# Patient Record
Sex: FEMALE | Race: WHITE | NOT HISPANIC OR LATINO | Employment: OTHER
[De-identification: names, ages, dates, MRNs, and addresses within clinical notes are randomized per-mention and may not be internally consistent; named-entity substitution may affect disease eponyms.]

---

## 2017-01-03 ENCOUNTER — CHARTING TRANS (OUTPATIENT)
Dept: OTHER | Age: 77
End: 2017-01-03

## 2017-01-05 ENCOUNTER — CHARTING TRANS (OUTPATIENT)
Dept: OTHER | Age: 77
End: 2017-01-05

## 2017-01-06 ENCOUNTER — BH HISTORICAL (OUTPATIENT)
Dept: OTHER | Age: 77
End: 2017-01-06

## 2017-02-03 ENCOUNTER — CHARTING TRANS (OUTPATIENT)
Dept: OTHER | Age: 77
End: 2017-02-03

## 2017-03-20 ENCOUNTER — CHARTING TRANS (OUTPATIENT)
Dept: OTHER | Age: 77
End: 2017-03-20

## 2017-04-04 ENCOUNTER — BH HISTORICAL (OUTPATIENT)
Dept: OTHER | Age: 77
End: 2017-04-04

## 2017-04-05 ENCOUNTER — CHARTING TRANS (OUTPATIENT)
Dept: OTHER | Age: 77
End: 2017-04-05

## 2017-04-19 ENCOUNTER — CHARTING TRANS (OUTPATIENT)
Dept: OTHER | Age: 77
End: 2017-04-19

## 2017-05-03 ENCOUNTER — BH HISTORICAL (OUTPATIENT)
Dept: OTHER | Age: 77
End: 2017-05-03

## 2017-06-19 ENCOUNTER — CHARTING TRANS (OUTPATIENT)
Dept: OTHER | Age: 77
End: 2017-06-19

## 2017-06-22 ENCOUNTER — CHARTING TRANS (OUTPATIENT)
Dept: OTHER | Age: 77
End: 2017-06-22

## 2017-06-26 ENCOUNTER — BH HISTORICAL (OUTPATIENT)
Dept: OTHER | Age: 77
End: 2017-06-26

## 2017-06-27 ENCOUNTER — CHARTING TRANS (OUTPATIENT)
Dept: OTHER | Age: 77
End: 2017-06-27

## 2017-06-28 ENCOUNTER — BH HISTORICAL (OUTPATIENT)
Dept: OTHER | Age: 77
End: 2017-06-28

## 2017-06-28 ENCOUNTER — CHARTING TRANS (OUTPATIENT)
Dept: OTHER | Age: 77
End: 2017-06-28

## 2017-07-05 ENCOUNTER — CHARTING TRANS (OUTPATIENT)
Dept: OTHER | Age: 77
End: 2017-07-05

## 2017-07-07 ENCOUNTER — CHARTING TRANS (OUTPATIENT)
Dept: OTHER | Age: 77
End: 2017-07-07

## 2017-07-24 ENCOUNTER — CHARTING TRANS (OUTPATIENT)
Dept: OTHER | Age: 77
End: 2017-07-24

## 2017-08-10 ENCOUNTER — CHARTING TRANS (OUTPATIENT)
Dept: OTHER | Age: 77
End: 2017-08-10

## 2017-09-07 ENCOUNTER — CHARTING TRANS (OUTPATIENT)
Dept: OTHER | Age: 77
End: 2017-09-07

## 2017-09-13 ENCOUNTER — CHARTING TRANS (OUTPATIENT)
Dept: INTERNAL MEDICINE | Age: 77
End: 2017-09-13

## 2017-11-27 ENCOUNTER — CHARTING TRANS (OUTPATIENT)
Dept: OTHER | Age: 77
End: 2017-11-27

## 2017-12-04 ENCOUNTER — CHARTING TRANS (OUTPATIENT)
Dept: OTHER | Age: 77
End: 2017-12-04

## 2018-04-04 ENCOUNTER — CHARTING TRANS (OUTPATIENT)
Dept: OTHER | Age: 78
End: 2018-04-04

## 2018-05-09 ENCOUNTER — LAB SERVICES (OUTPATIENT)
Dept: OTHER | Age: 78
End: 2018-05-09

## 2018-05-09 LAB
ALBUMIN SERPL BCG-MCNC: 4.3 G/DL (ref 3.6–5.1)
ALP SERPL-CCNC: 50 U/L (ref 45–105)
ALT SERPL W/O P-5'-P-CCNC: 13 U/L (ref 7–34)
AST SERPL-CCNC: 18 U/L (ref 9–37)
BILIRUB SERPL-MCNC: 0.4 MG/DL (ref 0–1)
BUN SERPL-MCNC: 16 MG/DL (ref 7–20)
CALCIUM SERPL-MCNC: 9.9 MG/DL (ref 8.6–10.6)
CHLORIDE SERPL-SCNC: 103 MMOL/L (ref 96–107)
CHOLEST SERPL-MCNC: 206 MG/DL (ref 140–200)
CREAT SERPL-MCNC: 0.9 MG/DL (ref 0.5–1.4)
GFR SERPL CREATININE-BSD FRML MDRD: 58 ML/MIN/{1.73M2}
GFR SERPL CREATININE-BSD FRML MDRD: >60 ML/MIN/{1.73M2}
GLUCOSE P FAST SERPL-MCNC: 103 MG/DL (ref 60–100)
HCO3 SERPL-SCNC: 28 MMOL/L (ref 22–32)
HDLC SERPL-MCNC: 55 MG/DL
LDLC SERPL CALC-MCNC: 126 MG/DL (ref 0–100)
POTASSIUM SERPL-SCNC: 4.7 MMOL/L (ref 3.5–5.3)
PROT SERPL-MCNC: 7.4 G/DL (ref 6.2–8.1)
SODIUM SERPL-SCNC: 139 MMOL/L (ref 136–146)
TRIGL SERPL-MCNC: 128 MG/DL (ref 0–200)

## 2018-05-10 LAB — TSH SERPL DL<=0.05 MIU/L-ACNC: 2.19 M[IU]/L (ref 0.3–4.82)

## 2018-05-11 ENCOUNTER — CHARTING TRANS (OUTPATIENT)
Dept: OTHER | Age: 78
End: 2018-05-11

## 2018-05-14 ENCOUNTER — CHARTING TRANS (OUTPATIENT)
Dept: OTHER | Age: 78
End: 2018-05-14

## 2018-05-31 ENCOUNTER — CHARTING TRANS (OUTPATIENT)
Dept: OTHER | Age: 78
End: 2018-05-31

## 2018-06-05 ENCOUNTER — CHARTING TRANS (OUTPATIENT)
Dept: OTHER | Age: 78
End: 2018-06-05

## 2018-08-01 ENCOUNTER — CHARTING TRANS (OUTPATIENT)
Dept: OTHER | Age: 78
End: 2018-08-01

## 2018-08-15 ENCOUNTER — CHARTING TRANS (OUTPATIENT)
Dept: OTHER | Age: 78
End: 2018-08-15

## 2018-08-21 ENCOUNTER — CHARTING TRANS (OUTPATIENT)
Dept: OTHER | Age: 78
End: 2018-08-21

## 2018-09-13 ENCOUNTER — CHARTING TRANS (OUTPATIENT)
Dept: OTHER | Age: 78
End: 2018-09-13

## 2018-09-13 ENCOUNTER — ANCILLARY ORDERS (OUTPATIENT)
Dept: OTHER | Age: 78
End: 2018-09-13

## 2018-09-13 DIAGNOSIS — Z12.31 ENCOUNTER FOR SCREENING MAMMOGRAM FOR MALIGNANT NEOPLASM OF BREAST: ICD-10-CM

## 2018-09-13 DIAGNOSIS — Z00.00 ENCOUNTER FOR GENERAL ADULT MEDICAL EXAMINATION WITHOUT ABNORMAL FINDINGS: ICD-10-CM

## 2018-09-14 ENCOUNTER — CHARTING TRANS (OUTPATIENT)
Dept: OTHER | Age: 78
End: 2018-09-14

## 2018-10-02 ENCOUNTER — CHARTING TRANS (OUTPATIENT)
Dept: OTHER | Age: 78
End: 2018-10-02

## 2018-10-03 ENCOUNTER — CHARTING TRANS (OUTPATIENT)
Dept: OTHER | Age: 78
End: 2018-10-03

## 2018-10-05 ENCOUNTER — CHARTING TRANS (OUTPATIENT)
Dept: OTHER | Age: 78
End: 2018-10-05

## 2018-10-09 ENCOUNTER — CHARTING TRANS (OUTPATIENT)
Dept: OTHER | Age: 78
End: 2018-10-09

## 2018-10-12 ENCOUNTER — CHARTING TRANS (OUTPATIENT)
Dept: OTHER | Age: 78
End: 2018-10-12

## 2018-10-16 ENCOUNTER — CHARTING TRANS (OUTPATIENT)
Dept: OTHER | Age: 78
End: 2018-10-16

## 2018-10-19 ENCOUNTER — CHARTING TRANS (OUTPATIENT)
Dept: OTHER | Age: 78
End: 2018-10-19

## 2018-10-22 ENCOUNTER — CHARTING TRANS (OUTPATIENT)
Dept: OTHER | Age: 78
End: 2018-10-22

## 2018-10-30 ENCOUNTER — CHARTING TRANS (OUTPATIENT)
Dept: OTHER | Age: 78
End: 2018-10-30

## 2018-10-31 ENCOUNTER — CHARTING TRANS (OUTPATIENT)
Dept: OTHER | Age: 78
End: 2018-10-31

## 2018-11-02 ENCOUNTER — CHARTING TRANS (OUTPATIENT)
Dept: OTHER | Age: 78
End: 2018-11-02

## 2018-11-23 ENCOUNTER — IMAGING SERVICES (OUTPATIENT)
Dept: OTHER | Age: 78
End: 2018-11-23

## 2018-11-28 VITALS
DIASTOLIC BLOOD PRESSURE: 80 MMHG | HEART RATE: 68 BPM | WEIGHT: 159 LBS | TEMPERATURE: 98.1 F | BODY MASS INDEX: 29.26 KG/M2 | SYSTOLIC BLOOD PRESSURE: 126 MMHG | HEIGHT: 62 IN | RESPIRATION RATE: 14 BRPM

## 2018-11-29 VITALS
SYSTOLIC BLOOD PRESSURE: 106 MMHG | WEIGHT: 157 LBS | HEIGHT: 61 IN | RESPIRATION RATE: 16 BRPM | BODY MASS INDEX: 29.64 KG/M2 | DIASTOLIC BLOOD PRESSURE: 80 MMHG | HEART RATE: 80 BPM

## 2019-01-27 RX ORDER — LOVASTATIN 10 MG/1
TABLET ORAL
COMMUNITY
Start: 2018-09-13 | End: 2019-01-29 | Stop reason: SDUPTHER

## 2019-01-27 RX ORDER — LEVOTHYROXINE SODIUM 0.05 MG/1
TABLET ORAL
COMMUNITY
Start: 2018-05-30 | End: 2019-01-29 | Stop reason: SDUPTHER

## 2019-01-27 RX ORDER — LOVASTATIN 20 MG/1
TABLET ORAL
COMMUNITY
Start: 2018-09-13 | End: 2019-01-29 | Stop reason: SDUPTHER

## 2019-01-27 RX ORDER — NYSTATIN 100000 [USP'U]/G
POWDER TOPICAL
COMMUNITY
Start: 2018-09-13 | End: 2019-11-18 | Stop reason: ALTCHOICE

## 2019-01-27 RX ORDER — VENLAFAXINE HYDROCHLORIDE 75 MG/1
CAPSULE, EXTENDED RELEASE ORAL
COMMUNITY
Start: 2018-09-13 | End: 2019-01-29 | Stop reason: SDUPTHER

## 2019-01-29 ENCOUNTER — OFFICE VISIT (OUTPATIENT)
Dept: INTERNAL MEDICINE | Age: 79
End: 2019-01-29

## 2019-01-29 ENCOUNTER — TELEPHONE (OUTPATIENT)
Dept: INTERNAL MEDICINE | Age: 79
End: 2019-01-29

## 2019-01-29 VITALS
WEIGHT: 148 LBS | HEART RATE: 70 BPM | TEMPERATURE: 97.3 F | HEIGHT: 62 IN | OXYGEN SATURATION: 98 % | SYSTOLIC BLOOD PRESSURE: 118 MMHG | BODY MASS INDEX: 27.23 KG/M2 | DIASTOLIC BLOOD PRESSURE: 76 MMHG

## 2019-01-29 DIAGNOSIS — G91.2 NPH (NORMAL PRESSURE HYDROCEPHALUS) (CMD): ICD-10-CM

## 2019-01-29 DIAGNOSIS — E03.9 HYPOTHYROIDISM, UNSPECIFIED TYPE: ICD-10-CM

## 2019-01-29 DIAGNOSIS — R41.3 MEMORY DEFICIT: ICD-10-CM

## 2019-01-29 DIAGNOSIS — F32.1 MODERATE MAJOR DEPRESSION (CMD): ICD-10-CM

## 2019-01-29 DIAGNOSIS — R26.81 UNSTEADY GAIT: ICD-10-CM

## 2019-01-29 DIAGNOSIS — E78.2 MIXED HYPERLIPIDEMIA: Primary | ICD-10-CM

## 2019-01-29 DIAGNOSIS — D32.9 MENINGIOMA (CMD): ICD-10-CM

## 2019-01-29 PROBLEM — I50.1 LEFT VENTRICULAR FAILURE (CMD): Status: ACTIVE | Noted: 2019-01-29

## 2019-01-29 PROCEDURE — 99214 OFFICE O/P EST MOD 30 MIN: CPT | Performed by: INTERNAL MEDICINE

## 2019-01-29 RX ORDER — LEVOTHYROXINE SODIUM 0.05 MG/1
50 TABLET ORAL DAILY
Qty: 90 TABLET | Refills: 0 | Status: SHIPPED | OUTPATIENT
Start: 2019-01-29 | End: 2019-02-05 | Stop reason: SDUPTHER

## 2019-01-29 RX ORDER — VENLAFAXINE HYDROCHLORIDE 75 MG/1
225 CAPSULE, EXTENDED RELEASE ORAL DAILY
Qty: 270 CAPSULE | Refills: 1 | Status: SHIPPED | OUTPATIENT
Start: 2019-01-29 | End: 2019-11-05 | Stop reason: SDUPTHER

## 2019-01-29 RX ORDER — LOVASTATIN 10 MG/1
10 TABLET ORAL NIGHTLY
Qty: 90 TABLET | Refills: 0 | Status: SHIPPED | OUTPATIENT
Start: 2019-01-29 | End: 2019-02-05 | Stop reason: SDUPTHER

## 2019-01-29 RX ORDER — LOVASTATIN 20 MG/1
20 TABLET ORAL NIGHTLY
Qty: 90 TABLET | Refills: 0 | Status: SHIPPED | OUTPATIENT
Start: 2019-01-29 | End: 2019-02-05 | Stop reason: SDUPTHER

## 2019-01-29 SDOH — HEALTH STABILITY: MENTAL HEALTH: HOW OFTEN DO YOU HAVE A DRINK CONTAINING ALCOHOL?: NEVER

## 2019-02-02 ENCOUNTER — LAB SERVICES (OUTPATIENT)
Dept: LAB | Age: 79
End: 2019-02-02

## 2019-02-02 DIAGNOSIS — R41.3 MEMORY DEFICIT: ICD-10-CM

## 2019-02-02 DIAGNOSIS — E03.9 HYPOTHYROIDISM, UNSPECIFIED TYPE: ICD-10-CM

## 2019-02-02 DIAGNOSIS — E78.2 MIXED HYPERLIPIDEMIA: ICD-10-CM

## 2019-02-02 LAB
ALBUMIN SERPL BCG-MCNC: 4.5 G/DL (ref 3.6–5.1)
ALP SERPL-CCNC: 55 U/L (ref 45–130)
ALT SERPL W/O P-5'-P-CCNC: 9 U/L (ref 7–34)
AST SERPL-CCNC: 14 U/L (ref 9–37)
BILIRUB SERPL-MCNC: 0.4 MG/DL (ref 0–1)
BUN SERPL-MCNC: 19 MG/DL (ref 7–20)
CALCIUM SERPL-MCNC: 10.5 MG/DL (ref 8.6–10.6)
CHLORIDE SERPL-SCNC: 101 MMOL/L (ref 96–107)
CHOLEST SERPL-MCNC: 163 MG/DL (ref 140–200)
CREAT SERPL-MCNC: 1.1 MG/DL (ref 0.5–1.4)
GFR SERPL CREATININE-BSD FRML MDRD: 47 ML/MIN/{1.73M2}
GFR SERPL CREATININE-BSD FRML MDRD: 57 ML/MIN/{1.73M2}
GLUCOSE P FAST SERPL-MCNC: 128 MG/DL (ref 60–100)
HCO3 SERPL-SCNC: 26 MMOL/L (ref 22–32)
HDLC SERPL-MCNC: 52 MG/DL
LDLC SERPL CALC-MCNC: 86 MG/DL (ref 0–100)
POTASSIUM SERPL-SCNC: 4.2 MMOL/L (ref 3.5–5.3)
PROT SERPL-MCNC: 7.5 G/DL (ref 6.2–8.1)
SODIUM SERPL-SCNC: 139 MMOL/L (ref 136–146)
TRIGL SERPL-MCNC: 126 MG/DL (ref 0–200)
TSH SERPL DL<=0.05 MIU/L-ACNC: 1.22 M[IU]/L (ref 0.3–4.82)
VIT B12 SERPL-MCNC: 458 PG/ML (ref 193–982)

## 2019-02-02 PROCEDURE — 82607 VITAMIN B-12: CPT | Performed by: INTERNAL MEDICINE

## 2019-02-02 PROCEDURE — 84443 ASSAY THYROID STIM HORMONE: CPT | Performed by: INTERNAL MEDICINE

## 2019-02-02 PROCEDURE — 80061 LIPID PANEL: CPT | Performed by: INTERNAL MEDICINE

## 2019-02-02 PROCEDURE — 36415 COLL VENOUS BLD VENIPUNCTURE: CPT | Performed by: INTERNAL MEDICINE

## 2019-02-02 PROCEDURE — 80053 COMPREHEN METABOLIC PANEL: CPT | Performed by: INTERNAL MEDICINE

## 2019-02-04 ENCOUNTER — TELEPHONE (OUTPATIENT)
Dept: INTERNAL MEDICINE | Age: 79
End: 2019-02-04

## 2019-02-04 DIAGNOSIS — R73.01 IMPAIRED FASTING GLUCOSE: Primary | ICD-10-CM

## 2019-02-04 DIAGNOSIS — E78.2 MIXED HYPERLIPIDEMIA: ICD-10-CM

## 2019-02-04 DIAGNOSIS — E03.9 HYPOTHYROIDISM, UNSPECIFIED TYPE: ICD-10-CM

## 2019-02-05 DIAGNOSIS — G91.2 NORMAL PRESSURE HYDROCEPHALUS (CMD): Primary | ICD-10-CM

## 2019-02-05 RX ORDER — LOVASTATIN 20 MG/1
20 TABLET ORAL NIGHTLY
Qty: 90 TABLET | Refills: 0 | Status: SHIPPED | OUTPATIENT
Start: 2019-02-05 | End: 2019-11-26 | Stop reason: SDUPTHER

## 2019-02-05 RX ORDER — LEVOTHYROXINE SODIUM 0.05 MG/1
50 TABLET ORAL DAILY
Qty: 90 TABLET | Refills: 1 | Status: SHIPPED | OUTPATIENT
Start: 2019-02-05 | End: 2019-11-07 | Stop reason: SDUPTHER

## 2019-02-05 RX ORDER — LOVASTATIN 10 MG/1
10 TABLET ORAL NIGHTLY
Qty: 90 TABLET | Refills: 0 | Status: SHIPPED | OUTPATIENT
Start: 2019-02-05 | End: 2019-11-25 | Stop reason: SDUPTHER

## 2019-02-18 ENCOUNTER — IMAGING SERVICES (OUTPATIENT)
Dept: MRI IMAGING | Age: 79
End: 2019-02-18

## 2019-02-18 DIAGNOSIS — G91.2 NORMAL PRESSURE HYDROCEPHALUS (CMD): Primary | ICD-10-CM

## 2019-02-18 DIAGNOSIS — G91.2 NORMAL PRESSURE HYDROCEPHALUS (CMD): ICD-10-CM

## 2019-02-18 LAB — GFR, POC: 60 MG/DL

## 2019-02-18 PROCEDURE — 70553 MRI BRAIN STEM W/O & W/DYE: CPT | Performed by: RADIOLOGY

## 2019-02-18 PROCEDURE — 82565 ASSAY OF CREATININE: CPT | Performed by: INTERNAL MEDICINE

## 2019-02-18 PROCEDURE — A9585 GADOBUTROL INJECTION: HCPCS

## 2019-02-18 RX ORDER — GADOBUTROL 604.72 MG/ML
30 INJECTION INTRAVENOUS ONCE
Status: COMPLETED | OUTPATIENT
Start: 2019-02-18 | End: 2019-02-18

## 2019-02-18 RX ADMIN — GADOBUTROL 6 ML: 604.72 INJECTION INTRAVENOUS at 16:45

## 2019-03-05 ENCOUNTER — OFFICE VISIT (OUTPATIENT)
Dept: INTERNAL MEDICINE | Age: 79
End: 2019-03-05

## 2019-03-05 VITALS
BODY MASS INDEX: 27.6 KG/M2 | HEIGHT: 62 IN | TEMPERATURE: 97.2 F | HEART RATE: 84 BPM | SYSTOLIC BLOOD PRESSURE: 108 MMHG | WEIGHT: 150 LBS | DIASTOLIC BLOOD PRESSURE: 64 MMHG

## 2019-03-05 VITALS
WEIGHT: 148 LBS | HEART RATE: 75 BPM | TEMPERATURE: 96.7 F | HEIGHT: 60 IN | SYSTOLIC BLOOD PRESSURE: 108 MMHG | OXYGEN SATURATION: 99 % | BODY MASS INDEX: 29.06 KG/M2 | DIASTOLIC BLOOD PRESSURE: 76 MMHG

## 2019-03-05 DIAGNOSIS — G91.2 NORMAL PRESSURE HYDROCEPHALUS (CMD): ICD-10-CM

## 2019-03-05 DIAGNOSIS — R26.9 GAIT DISORDER: ICD-10-CM

## 2019-03-05 DIAGNOSIS — R41.844 EXECUTIVE FUNCTION DEFICIT: ICD-10-CM

## 2019-03-05 DIAGNOSIS — R41.3 MEMORY DEFICITS: Primary | ICD-10-CM

## 2019-03-05 PROCEDURE — 99214 OFFICE O/P EST MOD 30 MIN: CPT | Performed by: INTERNAL MEDICINE

## 2019-03-06 ENCOUNTER — TELEPHONE (OUTPATIENT)
Dept: BEHAVIORAL HEALTH | Age: 79
End: 2019-03-06

## 2019-03-19 ENCOUNTER — TELEPHONE (OUTPATIENT)
Dept: INTERNAL MEDICINE | Age: 79
End: 2019-03-19

## 2019-03-19 ENCOUNTER — TELEPHONE (OUTPATIENT)
Dept: BEHAVIORAL HEALTH | Age: 79
End: 2019-03-19

## 2019-05-22 ENCOUNTER — OFFICE VISIT (OUTPATIENT)
Dept: PSYCHOLOGY | Age: 79
End: 2019-05-22

## 2019-05-22 DIAGNOSIS — Z01.89 ENCOUNTER FOR NEUROPSYCHOLOGICAL TESTING: Primary | ICD-10-CM

## 2019-05-22 PROCEDURE — X1094 NO CHARGE VISIT: HCPCS | Performed by: PSYCHOLOGIST

## 2019-05-24 ENCOUNTER — APPOINTMENT (OUTPATIENT)
Dept: PSYCHOLOGY | Age: 79
End: 2019-05-24

## 2019-06-13 ENCOUNTER — TELEPHONE (OUTPATIENT)
Dept: PSYCHOLOGY | Age: 79
End: 2019-06-13

## 2019-06-13 ENCOUNTER — OFFICE VISIT (OUTPATIENT)
Dept: PSYCHOLOGY | Age: 79
End: 2019-06-13

## 2019-06-13 DIAGNOSIS — F32.A DEPRESSION, UNSPECIFIED DEPRESSION TYPE: ICD-10-CM

## 2019-06-13 DIAGNOSIS — R41.3 MEMORY LOSS: Primary | ICD-10-CM

## 2019-06-13 DIAGNOSIS — F03.90: ICD-10-CM

## 2019-06-13 PROCEDURE — 96133 NRPSYC TST EVAL PHYS/QHP EA: CPT | Performed by: PSYCHOLOGIST

## 2019-06-13 PROCEDURE — 90791 PSYCH DIAGNOSTIC EVALUATION: CPT | Performed by: PSYCHOLOGIST

## 2019-06-13 PROCEDURE — 96132 NRPSYC TST EVAL PHYS/QHP 1ST: CPT | Performed by: PSYCHOLOGIST

## 2019-06-13 PROCEDURE — 96137 PSYCL/NRPSYC TST PHY/QHP EA: CPT | Performed by: PSYCHOLOGIST

## 2019-06-13 PROCEDURE — 96136 PSYCL/NRPSYC TST PHY/QHP 1ST: CPT | Performed by: PSYCHOLOGIST

## 2019-06-27 ENCOUNTER — TELEPHONE (OUTPATIENT)
Dept: BEHAVIORAL HEALTH | Age: 79
End: 2019-06-27

## 2019-06-28 ENCOUNTER — TELEPHONE (OUTPATIENT)
Dept: INTERNAL MEDICINE | Age: 79
End: 2019-06-28

## 2019-08-13 ENCOUNTER — OFFICE VISIT (OUTPATIENT)
Dept: FAMILY MEDICINE | Age: 79
End: 2019-08-13

## 2019-08-13 VITALS
HEART RATE: 80 BPM | DIASTOLIC BLOOD PRESSURE: 78 MMHG | WEIGHT: 153 LBS | BODY MASS INDEX: 29.88 KG/M2 | SYSTOLIC BLOOD PRESSURE: 130 MMHG

## 2019-08-13 DIAGNOSIS — F32.0 MILD MAJOR DEPRESSION (CMD): ICD-10-CM

## 2019-08-13 DIAGNOSIS — R41.3 MEMORY LOSS: Primary | ICD-10-CM

## 2019-08-13 DIAGNOSIS — F03.90 DEMENTIA WITHOUT BEHAVIORAL DISTURBANCE, UNSPECIFIED DEMENTIA TYPE: Primary | ICD-10-CM

## 2019-08-13 PROCEDURE — 99215 OFFICE O/P EST HI 40 MIN: CPT | Performed by: INTERNAL MEDICINE

## 2019-08-13 PROCEDURE — X1094 NO CHARGE VISIT: HCPCS

## 2019-08-13 RX ORDER — DONEPEZIL HYDROCHLORIDE 5 MG/1
5 TABLET, FILM COATED ORAL NIGHTLY
Qty: 30 TABLET | Refills: 3 | Status: SHIPPED | OUTPATIENT
Start: 2019-08-13 | End: 2019-11-18 | Stop reason: DRUGHIGH

## 2019-08-22 ENCOUNTER — TELEPHONE (OUTPATIENT)
Dept: BEHAVIORAL HEALTH | Age: 79
End: 2019-08-22

## 2019-11-05 DIAGNOSIS — F32.1 MODERATE MAJOR DEPRESSION (CMD): ICD-10-CM

## 2019-11-07 DIAGNOSIS — E03.9 HYPOTHYROIDISM, UNSPECIFIED TYPE: ICD-10-CM

## 2019-11-11 RX ORDER — VENLAFAXINE HYDROCHLORIDE 75 MG/1
CAPSULE, EXTENDED RELEASE ORAL
Qty: 60 CAPSULE | Refills: 5 | Status: SHIPPED | OUTPATIENT
Start: 2019-11-11 | End: 2020-02-20 | Stop reason: SDUPTHER

## 2019-11-12 RX ORDER — LEVOTHYROXINE SODIUM 0.05 MG/1
TABLET ORAL
Qty: 30 TABLET | Refills: 0 | Status: SHIPPED | OUTPATIENT
Start: 2019-11-12 | End: 2019-12-18 | Stop reason: SDUPTHER

## 2019-11-18 ENCOUNTER — OFFICE VISIT (OUTPATIENT)
Dept: FAMILY MEDICINE | Age: 79
End: 2019-11-18

## 2019-11-18 VITALS
BODY MASS INDEX: 27.73 KG/M2 | DIASTOLIC BLOOD PRESSURE: 78 MMHG | HEART RATE: 76 BPM | WEIGHT: 142 LBS | SYSTOLIC BLOOD PRESSURE: 120 MMHG

## 2019-11-18 DIAGNOSIS — H91.93 HEARING DIFFICULTY OF BOTH EARS: ICD-10-CM

## 2019-11-18 DIAGNOSIS — R41.3 MEMORY LOSS: Primary | ICD-10-CM

## 2019-11-18 DIAGNOSIS — F03.90 DEMENTIA WITHOUT BEHAVIORAL DISTURBANCE, UNSPECIFIED DEMENTIA TYPE: Primary | ICD-10-CM

## 2019-11-18 PROCEDURE — 99215 OFFICE O/P EST HI 40 MIN: CPT | Performed by: INTERNAL MEDICINE

## 2019-11-18 RX ORDER — DONEPEZIL HYDROCHLORIDE 10 MG/1
10 TABLET, FILM COATED ORAL NIGHTLY
Qty: 90 TABLET | Refills: 3 | Status: SHIPPED | OUTPATIENT
Start: 2019-11-18 | End: 2020-05-27 | Stop reason: ALTCHOICE

## 2019-11-25 DIAGNOSIS — E78.2 MIXED HYPERLIPIDEMIA: ICD-10-CM

## 2019-11-26 RX ORDER — LOVASTATIN 10 MG/1
TABLET ORAL
Qty: 30 TABLET | Refills: 0 | Status: SHIPPED | OUTPATIENT
Start: 2019-11-26 | End: 2019-12-26 | Stop reason: SDUPTHER

## 2019-11-26 RX ORDER — LOVASTATIN 20 MG/1
20 TABLET ORAL NIGHTLY
Qty: 30 TABLET | Refills: 0 | Status: SHIPPED | OUTPATIENT
Start: 2019-11-26 | End: 2019-12-26 | Stop reason: SDUPTHER

## 2019-12-10 ENCOUNTER — LAB SERVICES (OUTPATIENT)
Dept: LAB | Age: 79
End: 2019-12-10

## 2019-12-10 DIAGNOSIS — E78.2 MIXED HYPERLIPIDEMIA: ICD-10-CM

## 2019-12-10 DIAGNOSIS — E03.9 HYPOTHYROIDISM, UNSPECIFIED TYPE: ICD-10-CM

## 2019-12-10 PROCEDURE — 80061 LIPID PANEL: CPT | Performed by: INTERNAL MEDICINE

## 2019-12-10 PROCEDURE — 36415 COLL VENOUS BLD VENIPUNCTURE: CPT | Performed by: INTERNAL MEDICINE

## 2019-12-10 PROCEDURE — 84443 ASSAY THYROID STIM HORMONE: CPT | Performed by: INTERNAL MEDICINE

## 2019-12-10 PROCEDURE — 80076 HEPATIC FUNCTION PANEL: CPT | Performed by: INTERNAL MEDICINE

## 2019-12-11 LAB
ALBUMIN SERPL BCG-MCNC: 4.4 G/DL (ref 3.6–5.1)
ALP SERPL-CCNC: 62 U/L (ref 45–130)
ALT SERPL W/O P-5'-P-CCNC: 10 U/L (ref 7–34)
AST SERPL-CCNC: 12 U/L (ref 9–37)
BILIRUB DIRECT SERPL-MCNC: 0.1 MG/DL (ref 0–0.2)
BILIRUB SERPL-MCNC: 0.6 MG/DL (ref 0–1)
CHOLEST SERPL-MCNC: 199 MG/DL (ref 140–200)
HDLC SERPL-MCNC: 54 MG/DL
LDLC SERPL CALC-MCNC: 113 MG/DL (ref 0–100)
PROT SERPL-MCNC: 7.5 G/DL (ref 6.2–8.1)
TRIGL SERPL-MCNC: 160 MG/DL (ref 0–200)
TSH SERPL DL<=0.05 MIU/L-ACNC: 0.37 M[IU]/L (ref 0.3–4.82)

## 2019-12-18 DIAGNOSIS — E03.9 HYPOTHYROIDISM, UNSPECIFIED TYPE: ICD-10-CM

## 2019-12-18 RX ORDER — LEVOTHYROXINE SODIUM 0.05 MG/1
TABLET ORAL
Qty: 90 TABLET | Refills: 1 | Status: SHIPPED | OUTPATIENT
Start: 2019-12-18 | End: 2020-02-20 | Stop reason: DRUGHIGH

## 2019-12-26 DIAGNOSIS — E78.2 MIXED HYPERLIPIDEMIA: ICD-10-CM

## 2019-12-27 RX ORDER — LOVASTATIN 20 MG/1
TABLET ORAL
Qty: 90 TABLET | Refills: 0 | Status: SHIPPED | OUTPATIENT
Start: 2019-12-27 | End: 2020-03-31

## 2019-12-27 RX ORDER — LOVASTATIN 10 MG/1
TABLET ORAL
Qty: 90 TABLET | Refills: 0 | Status: SHIPPED | OUTPATIENT
Start: 2019-12-27 | End: 2020-03-31

## 2020-01-02 ENCOUNTER — TELEPHONE (OUTPATIENT)
Dept: INTERNAL MEDICINE | Age: 80
End: 2020-01-02

## 2020-01-02 DIAGNOSIS — E03.9 HYPOTHYROIDISM, UNSPECIFIED TYPE: Primary | ICD-10-CM

## 2020-01-13 ENCOUNTER — TELEPHONE (OUTPATIENT)
Dept: INTERNAL MEDICINE | Age: 80
End: 2020-01-13

## 2020-01-19 ENCOUNTER — APPOINTMENT (OUTPATIENT)
Dept: GENERAL RADIOLOGY | Facility: HOSPITAL | Age: 80
End: 2020-01-19
Attending: EMERGENCY MEDICINE
Payer: MEDICARE

## 2020-01-19 ENCOUNTER — HOSPITAL ENCOUNTER (OUTPATIENT)
Facility: HOSPITAL | Age: 80
Setting detail: OBSERVATION
Discharge: NURSING, REHAB OR LONG-TERM CARE | End: 2020-01-22
Attending: EMERGENCY MEDICINE | Admitting: HOSPITALIST
Payer: MEDICARE

## 2020-01-19 DIAGNOSIS — N39.0 URINARY TRACT INFECTION WITHOUT HEMATURIA, SITE UNSPECIFIED: Primary | ICD-10-CM

## 2020-01-19 DIAGNOSIS — R53.1 WEAKNESS GENERALIZED: ICD-10-CM

## 2020-01-19 DIAGNOSIS — E86.0 DEHYDRATION: ICD-10-CM

## 2020-01-19 PROBLEM — R79.89 AZOTEMIA: Status: ACTIVE | Noted: 2020-01-19

## 2020-01-19 LAB
ALBUMIN SERPL-MCNC: 3.6 G/DL (ref 3.4–5)
ALBUMIN/GLOB SERPL: 0.7 {RATIO} (ref 1–2)
ALP LIVER SERPL-CCNC: 63 U/L (ref 55–142)
ALT SERPL-CCNC: 16 U/L (ref 13–56)
ANION GAP SERPL CALC-SCNC: 5 MMOL/L (ref 0–18)
AST SERPL-CCNC: 15 U/L (ref 15–37)
BASOPHILS # BLD AUTO: 0.02 X10(3) UL (ref 0–0.2)
BASOPHILS NFR BLD AUTO: 0.2 %
BILIRUB SERPL-MCNC: 0.4 MG/DL (ref 0.1–2)
BILIRUB UR QL STRIP.AUTO: NEGATIVE
BUN BLD-MCNC: 22 MG/DL (ref 7–18)
BUN/CREAT SERPL: 22.4 (ref 10–20)
CALCIUM BLD-MCNC: 10.8 MG/DL (ref 8.5–10.1)
CHLORIDE SERPL-SCNC: 106 MMOL/L (ref 98–112)
CO2 SERPL-SCNC: 27 MMOL/L (ref 21–32)
COLOR UR AUTO: YELLOW
CREAT BLD-MCNC: 0.98 MG/DL (ref 0.55–1.02)
DEPRECATED RDW RBC AUTO: 42.7 FL (ref 35.1–46.3)
EOSINOPHIL # BLD AUTO: 0 X10(3) UL (ref 0–0.7)
EOSINOPHIL NFR BLD AUTO: 0 %
ERYTHROCYTE [DISTWIDTH] IN BLOOD BY AUTOMATED COUNT: 13.1 % (ref 11–15)
GLOBULIN PLAS-MCNC: 5 G/DL (ref 2.8–4.4)
GLUCOSE BLD-MCNC: 90 MG/DL (ref 70–99)
GLUCOSE UR STRIP.AUTO-MCNC: NEGATIVE MG/DL
HCT VFR BLD AUTO: 44.8 % (ref 35–48)
HGB BLD-MCNC: 14.7 G/DL (ref 12–16)
HYALINE CASTS #/AREA URNS AUTO: PRESENT /LPF
IMM GRANULOCYTES # BLD AUTO: 0.03 X10(3) UL (ref 0–1)
IMM GRANULOCYTES NFR BLD: 0.3 %
KETONES UR STRIP.AUTO-MCNC: 20 MG/DL
LYMPHOCYTES # BLD AUTO: 2.16 X10(3) UL (ref 1–4)
LYMPHOCYTES NFR BLD AUTO: 24.6 %
M PROTEIN MFR SERPL ELPH: 8.6 G/DL (ref 6.4–8.2)
MCH RBC QN AUTO: 29.6 PG (ref 26–34)
MCHC RBC AUTO-ENTMCNC: 32.8 G/DL (ref 31–37)
MCV RBC AUTO: 90.1 FL (ref 80–100)
MONOCYTES # BLD AUTO: 0.78 X10(3) UL (ref 0.1–1)
MONOCYTES NFR BLD AUTO: 8.9 %
NEUTROPHILS # BLD AUTO: 5.79 X10 (3) UL (ref 1.5–7.7)
NEUTROPHILS # BLD AUTO: 5.79 X10(3) UL (ref 1.5–7.7)
NEUTROPHILS NFR BLD AUTO: 66 %
NITRITE UR QL STRIP.AUTO: POSITIVE
OSMOLALITY SERPL CALC.SUM OF ELEC: 289 MOSM/KG (ref 275–295)
PH UR STRIP.AUTO: 5 [PH] (ref 4.5–8)
PLATELET # BLD AUTO: 256 10(3)UL (ref 150–450)
POTASSIUM SERPL-SCNC: 3.6 MMOL/L (ref 3.5–5.1)
PROT UR STRIP.AUTO-MCNC: NEGATIVE MG/DL
RBC # BLD AUTO: 4.97 X10(6)UL (ref 3.8–5.3)
SODIUM SERPL-SCNC: 138 MMOL/L (ref 136–145)
SP GR UR STRIP.AUTO: 1.02 (ref 1–1.03)
T3FREE SERPL-MCNC: 1.68 PG/ML (ref 2.4–4.2)
T4 FREE SERPL-MCNC: 1.4 NG/DL (ref 0.8–1.7)
TSI SER-ACNC: 0.29 MIU/ML (ref 0.36–3.74)
UROBILINOGEN UR STRIP.AUTO-MCNC: <2 MG/DL
WBC # BLD AUTO: 8.8 X10(3) UL (ref 4–11)

## 2020-01-19 PROCEDURE — 99220 INITIAL OBSERVATION CARE,LEVL III: CPT | Performed by: INTERNAL MEDICINE

## 2020-01-19 PROCEDURE — 71045 X-RAY EXAM CHEST 1 VIEW: CPT | Performed by: EMERGENCY MEDICINE

## 2020-01-19 RX ORDER — DONEPEZIL HYDROCHLORIDE 10 MG/1
10 TABLET, FILM COATED ORAL NIGHTLY
Status: ON HOLD | COMMUNITY
End: 2020-06-25

## 2020-01-19 RX ORDER — SODIUM CHLORIDE 9 MG/ML
INJECTION, SOLUTION INTRAVENOUS CONTINUOUS
Status: ACTIVE | OUTPATIENT
Start: 2020-01-19 | End: 2020-01-19

## 2020-01-19 RX ORDER — METOCLOPRAMIDE HYDROCHLORIDE 5 MG/ML
5 INJECTION INTRAMUSCULAR; INTRAVENOUS EVERY 8 HOURS PRN
Status: DISCONTINUED | OUTPATIENT
Start: 2020-01-19 | End: 2020-01-22

## 2020-01-19 RX ORDER — HEPARIN SODIUM 5000 [USP'U]/ML
5000 INJECTION, SOLUTION INTRAVENOUS; SUBCUTANEOUS EVERY 8 HOURS SCHEDULED
Status: DISCONTINUED | OUTPATIENT
Start: 2020-01-19 | End: 2020-01-22

## 2020-01-19 RX ORDER — VENLAFAXINE 75 MG/1
75 TABLET ORAL 2 TIMES DAILY
Status: ON HOLD | COMMUNITY
End: 2020-01-22 | Stop reason: CLARIF

## 2020-01-19 RX ORDER — DONEPEZIL HYDROCHLORIDE 10 MG/1
10 TABLET, FILM COATED ORAL NIGHTLY
Status: DISCONTINUED | OUTPATIENT
Start: 2020-01-19 | End: 2020-01-22

## 2020-01-19 RX ORDER — ONDANSETRON 2 MG/ML
4 INJECTION INTRAMUSCULAR; INTRAVENOUS EVERY 6 HOURS PRN
Status: DISCONTINUED | OUTPATIENT
Start: 2020-01-19 | End: 2020-01-22

## 2020-01-19 RX ORDER — VENLAFAXINE 75 MG/1
75 TABLET ORAL 2 TIMES DAILY
Status: DISCONTINUED | OUTPATIENT
Start: 2020-01-19 | End: 2020-01-22

## 2020-01-19 RX ORDER — ONDANSETRON 2 MG/ML
4 INJECTION INTRAMUSCULAR; INTRAVENOUS EVERY 4 HOURS PRN
Status: DISCONTINUED | OUTPATIENT
Start: 2020-01-19 | End: 2020-01-19

## 2020-01-19 RX ORDER — SODIUM CHLORIDE 9 MG/ML
INJECTION, SOLUTION INTRAVENOUS CONTINUOUS
Status: DISCONTINUED | OUTPATIENT
Start: 2020-01-19 | End: 2020-01-22

## 2020-01-19 RX ORDER — ACETAMINOPHEN 325 MG/1
650 TABLET ORAL EVERY 6 HOURS PRN
Status: DISCONTINUED | OUTPATIENT
Start: 2020-01-19 | End: 2020-01-22

## 2020-01-19 NOTE — ED INITIAL ASSESSMENT (HPI)
Pt to ED with family with reports of increased weakness at home. Per , pt has had recent diagnosis of dementia, more recently pt has been losing appetite and has shown increased weakness where she is having difficulty ambulating or standing.  Per hus

## 2020-01-19 NOTE — ED NOTES
Hospitalist and this RN at bedside, discussing plan of care with family and pt. Discussed contacting case management due to family's concerns of pt caring for self at home and  being primary care provider.

## 2020-01-19 NOTE — ED PROVIDER NOTES
Patient Seen in: [facility] Emergency Department      History   Patient presents with:  Altered Mental Status    Stated Complaint:  decreased appetite, altered mental status, episodes of incontinence the last f*    HPI    This is a 28-year-old femal Pulse 67   Temp 97.5 °F (36.4 °C) (Temporal)   Resp 19   Ht 154.9 cm (5' 1\")   Wt 69.4 kg   SpO2 98%   BMI 28.91 kg/m²         Physical Exam  General: . The patient's oral mucosa is wet the lips do look dry. The patient is in no respiratory distress. PLATELET    Narrative: The following orders were created for panel order CBC WITH DIFFERENTIAL WITH PLATELET.   Procedure                               Abnormality         Status                     ---------                               ----------- The patient was given ceftriaxone. The patient was given IV fluids. The patient's case was discussed with Dr. Barrett. The patient will be admitted. The family says he had extreme weakness she never fell and hit her head.   But they had a hard time e

## 2020-01-19 NOTE — H&P
[facility]  History and Physical     Mee Sandoval Patient Status:  Emergency    1940 MRN [id number]   Location [address] Attending Karlie Montgomery MD   Hosp Day # 0 PCP Marci Hartman     Chief Complaint: gen positives and negatives noted in the HPI. Physical Exam:    /74   Pulse 67   Temp 97.5 °F (36.4 °C) (Temporal)   Resp 19   Ht 154.9 cm (5' 1\")   Wt 153 lb (69.4 kg)   SpO2 98%   BMI 28.91 kg/m²   General: No acute distress.  Alert and oriented x 3 MD  1/19/2020

## 2020-01-20 PROBLEM — Z51.5 PALLIATIVE CARE ENCOUNTER: Status: ACTIVE | Noted: 2020-01-20

## 2020-01-20 PROBLEM — Z71.89 COUNSELING REGARDING ADVANCED CARE PLANNING AND GOALS OF CARE: Status: ACTIVE | Noted: 2020-01-20

## 2020-01-20 LAB
ANION GAP SERPL CALC-SCNC: 5 MMOL/L (ref 0–18)
ATRIAL RATE: 84 BPM
BASOPHILS # BLD AUTO: 0 X10(3) UL (ref 0–0.2)
BASOPHILS NFR BLD AUTO: 0 %
BUN BLD-MCNC: 25 MG/DL (ref 7–18)
BUN/CREAT SERPL: 32.1 (ref 10–20)
CALCIUM BLD-MCNC: 9.3 MG/DL (ref 8.5–10.1)
CHLORIDE SERPL-SCNC: 111 MMOL/L (ref 98–112)
CO2 SERPL-SCNC: 26 MMOL/L (ref 21–32)
CREAT BLD-MCNC: 0.78 MG/DL (ref 0.55–1.02)
DEPRECATED RDW RBC AUTO: 44.4 FL (ref 35.1–46.3)
EOSINOPHIL # BLD AUTO: 0 X10(3) UL (ref 0–0.7)
EOSINOPHIL NFR BLD AUTO: 0 %
ERYTHROCYTE [DISTWIDTH] IN BLOOD BY AUTOMATED COUNT: 13.1 % (ref 11–15)
GLUCOSE BLD-MCNC: 98 MG/DL (ref 70–99)
HCT VFR BLD AUTO: 41.6 % (ref 35–48)
HGB BLD-MCNC: 13.3 G/DL (ref 12–16)
IMM GRANULOCYTES # BLD AUTO: 0.01 X10(3) UL (ref 0–1)
IMM GRANULOCYTES NFR BLD: 0.2 %
LYMPHOCYTES # BLD AUTO: 1.66 X10(3) UL (ref 1–4)
LYMPHOCYTES NFR BLD AUTO: 28.7 %
MCH RBC QN AUTO: 29.6 PG (ref 26–34)
MCHC RBC AUTO-ENTMCNC: 32 G/DL (ref 31–37)
MCV RBC AUTO: 92.7 FL (ref 80–100)
MONOCYTES # BLD AUTO: 0.45 X10(3) UL (ref 0.1–1)
MONOCYTES NFR BLD AUTO: 7.8 %
NEUTROPHILS # BLD AUTO: 3.66 X10 (3) UL (ref 1.5–7.7)
NEUTROPHILS # BLD AUTO: 3.66 X10(3) UL (ref 1.5–7.7)
NEUTROPHILS NFR BLD AUTO: 63.3 %
OSMOLALITY SERPL CALC.SUM OF ELEC: 298 MOSM/KG (ref 275–295)
P AXIS: 40 DEGREES
P-R INTERVAL: 128 MS
PLATELET # BLD AUTO: 233 10(3)UL (ref 150–450)
POTASSIUM SERPL-SCNC: 4.5 MMOL/L (ref 3.5–5.1)
Q-T INTERVAL: 382 MS
QRS DURATION: 88 MS
QTC CALCULATION (BEZET): 451 MS
R AXIS: -7 DEGREES
RBC # BLD AUTO: 4.49 X10(6)UL (ref 3.8–5.3)
SODIUM SERPL-SCNC: 142 MMOL/L (ref 136–145)
T AXIS: 69 DEGREES
VENTRICULAR RATE: 84 BPM
WBC # BLD AUTO: 5.8 X10(3) UL (ref 4–11)

## 2020-01-20 PROCEDURE — 99215 OFFICE O/P EST HI 40 MIN: CPT | Performed by: NURSE PRACTITIONER

## 2020-01-20 PROCEDURE — 99225 SUBSEQUENT OBSERVATION CARE: CPT | Performed by: INTERNAL MEDICINE

## 2020-01-20 PROCEDURE — 99354 PROLONGED SERV,OFFICE,1ST HR: CPT | Performed by: NURSE PRACTITIONER

## 2020-01-20 RX ORDER — LEVOTHYROXINE SODIUM 0.05 MG/1
50 TABLET ORAL
Status: DISCONTINUED | OUTPATIENT
Start: 2020-01-20 | End: 2020-01-22

## 2020-01-20 NOTE — PHYSICAL THERAPY NOTE
PHYSICAL THERAPY EVALUATION - INPATIENT     Room Number: 422/422-A  Evaluation Date: 1/20/2020  Type of Evaluation: Initial  Physician Order: PT Eval and Treat    Presenting Problem: UTI, acute cystitis   Reason for Therapy: Mobility Dysfunction and follows one step commands with repetition  · Initiation: cues to initiate tasks  · Safety Judgement:  decreased awareness of need for assistance and decreased awareness of need for safety  · Awareness of Errors:  decreased awareness of errors     RANGE OF Provided: Pt received supine in bed and is agreeable to therapy. Pt supine-sit with MOD assist for trunk support. Pt demonstrates fair static sitting balance at EOB. Pt educated on RW use. Pt sit-stand with RW and MIN assist for force generation.  Pt ambula level of function. Subacute rehab is recommended for 14-17 days. After this period of rehabilitation patient should achieve MOD I level in functional mobility.   DISCHARGE RECOMMENDATIONS  PT Discharge Recommendations: Sub-acute rehabilitation(ELOS 14-17 da

## 2020-01-20 NOTE — PROGRESS NOTES
[facility] Note:  Renal Dose Adjustment for Metoclopramide (REGLAN)    Sly Ferrera has been prescribed Metoclopramide (REGLAN) 10 mg every 8 hours as needed for nausea/vomiting.     Estimated Creatinine Clearance: 36.3 mL/min (based on SCr of 0.98 mg/

## 2020-01-20 NOTE — PLAN OF CARE
Patient received via stretcher from ER, accompanied by family. Lungs clear on room air saturations 100%, denies cough and SOB. Abdomen soft, bowel sounds present, unknown LBM, currently eating regular diet and tolerating, denies nausea and vomiting.  Admiss

## 2020-01-20 NOTE — PROGRESS NOTES
[facility]  Progress Note     Tanner Millard Patient Status:  Observation    1940 MRN [id number]   [facility] 4NW-A Attending Isaura Spencer MD   Hosp Day # 0 PCP Gerson Sandoval     Chief Complaint: gen weakness    S: Patient • Levothyroxine Sodium  50 mcg Oral Before breakfast   • cefTRIAXone  1 g Intravenous Q24H   • Donepezil HCl  10 mg Oral Nightly   • Venlafaxine HCl  75 mg Oral BID   • Heparin Sodium (Porcine)  5,000 Units Subcutaneous Q8H [facility]       ASSESSMENT / PLAN:

## 2020-01-20 NOTE — PLAN OF CARE
Pt. A&O x 2-3. VSS. Pt. Is incontinent. Family at the bedside today meeting with Palliative Care and Social Work to figure out placement. Pt. Up in the chair for some of the day. Fall precautions in place. Call light within reach. Will continue to monitor. tolerated functional activity level and precautions during self-care     Outcome: Progressing

## 2020-01-20 NOTE — DIETARY NOTE
[address] ASSESSMENT    Pt does not meet malnutrition criteria at this time.     NUTRITION DIAGNOSIS/PROBLEM:    Inadequate energy intake related to physiologic causes as evidenced by reported poor PO intake and nutrition consult discretion    MONITOR AND EVALUATE/NUTRITION GOALS:    1. PO intake to meet at least 75% patient nutrition prescription  2.  At least 75% intake of oral supplements    MEDICATIONS:  IVF:NS @ 100 ml/hr    LABS:  Glu:98    Pt is at moderate nutrition risk

## 2020-01-20 NOTE — PLAN OF CARE
Alert x2-3. Did not know the situation. Vss. Denies pain. Per  pt has increasing weakness at home. Incontinent of urine. No BM overnight. Ivf infusing. Afebrile. PT/ OT ordered. SW ordered for discharge planning.  at the bedside.  Bed alarm on

## 2020-01-20 NOTE — CONSULTS
[address] [facility] Patient Status:  Observation    1940 MRN [id number]   [facility] 4NW-A Attending Gina Calderon MD   Hosp Day # 0 PCP Lj Alvares     Date of Consult: 20   H Prior to admission, pt was ambulating and completing ADLs without assistance (assistive device), though with recent h/o falls in shower. Required assistance with medication adherence. Medications:      Allergies:  No Known Allergies    Complete medicat PRN  Metoclopramide HCl, 5 mg, Q8H PRN     OBJECTIVE       Hematology:   Lab Results   Component Value Date    WBC 5.8 01/20/2020    HGB 13.3 01/20/2020    HCT 41.6 01/20/2020    .0 01/20/2020       Chemistry:  Lab Results   Component Value Date Normal or Reduced Full or confused   30 Bedbound Extensive Disease  Can't do any work Max Assist  Total Care Reduced Drowsy/confused   20 Bedbound Extensive Disease  Can't do any work Max Assist  Total Care Minimal Drowsy/confused   10 Bedbound/coma Extens to go/participate. She has been withdrawn, usually interacts with family in [address] [place] [address] settings, but recently does not participate even with close family members.  Spending more hours in bed than not, sometimes sleeping/resting 24hrs/day, skipping meals, re introduced, pt does not have this. Reviewed that pt is currently listed as a FULL Code in [address], with no ACP documents on file reflecting otherwise.  Reviewed importance of POLST to communicate pt's wishes and ensure they are respected throughout healthcare Established - plan LYNN with community PC. Family receptive to ongoing support pending hospital course.   - Family concern re pt's emotional status/depressive sx - consider psychiatric consultation; passed along to hospitalist.    CODE STATUS/POLST: DNR, DNI

## 2020-01-20 NOTE — OCCUPATIONAL THERAPY NOTE
OCCUPATIONAL THERAPY EVALUATION - INPATIENT     Room Number: 422/422-A  Evaluation Date: 1/20/2020  Type of Evaluation: Initial  Presenting Problem: UTI    Physician Order: IP Consult to Occupational Therapy  Reason for Therapy: ADL/IADL Dysfunction and Shama Angulo okay.\"    Patient self-stated goal is to go home.      OBJECTIVE  Precautions: None  Fall Risk: High fall risk    WEIGHT BEARING RESTRICTION  Weight Bearing Restriction: None                PAIN ASSESSMENT  Ratin  Location: none reported       COGNITIO Score (AM-PAC Scale): 37.26  CMS Modifier (G-Code): CK    FUNCTIONAL TRANSFER ASSESSMENT  Supine to Sit : Minimum assistance  Sit to Stand: Minimum assistance    Skilled Therapy Provided: Pt received semi-supine in bed with family present throughout [facility] maximizing patient’s ability to return safely to her prior level of function.     Patient Complexity  Occupational Profile/Medical History LOW - Brief history including review of medical or therapy records    Specific performance deficits impacting engageme

## 2020-01-20 NOTE — CM/SW NOTE
01/20/20 1200   CM/SW Referral Data   Referral Source Social Work (self-referral)   Reason for Referral Discharge planning   Informant Spouse; Children   Patient Info   Patient's Mental Status Confused   Patient's Home Environment House   Patient lives w

## 2020-01-21 PROCEDURE — 99225 SUBSEQUENT OBSERVATION CARE: CPT | Performed by: INTERNAL MEDICINE

## 2020-01-21 PROCEDURE — 90792 PSYCH DIAG EVAL W/MED SRVCS: CPT | Performed by: OTHER

## 2020-01-21 PROCEDURE — 99214 OFFICE O/P EST MOD 30 MIN: CPT | Performed by: NURSE PRACTITIONER

## 2020-01-21 RX ORDER — QUETIAPINE 25 MG/1
25 TABLET, FILM COATED ORAL NIGHTLY
Status: DISCONTINUED | OUTPATIENT
Start: 2020-01-21 | End: 2020-01-21

## 2020-01-21 RX ORDER — ARIPIPRAZOLE 2 MG/1
2 TABLET ORAL DAILY
Status: DISCONTINUED | OUTPATIENT
Start: 2020-01-21 | End: 2020-01-22

## 2020-01-21 NOTE — PLAN OF CARE
Pt. A&O x3-4. VSS. Afebrile. Pt. Receiving IV antibiotics; tolerating well. Pt. Able to ambulate to the bathroom with 1 staff assist. Pt. Seen by Dr. Lencho Gutierrez today. Plan to d/c to [address] [place] [address]; awaiting insurance authorization.  Daughter and  at the bedside patient/family in tolerated functional activity level and precautions during self-care     Outcome: Progressing

## 2020-01-21 NOTE — PLAN OF CARE
Assumed care of patient at [facility]. IVF infusing. IV antibiotic. Fall precautions in place.  Will continue to monitor

## 2020-01-21 NOTE — CM/SW NOTE
GRADY left message for [facility] inquiring about insurance authorization. Awaiting return call.   Sandee SEAY, 01/21/20, 4:30 PM

## 2020-01-21 NOTE — PROGRESS NOTES
[facility]  Progress Note     Candis Castellanos Patient Status:  Observation    1940 MRN [id number]   [facility] 4NW-A Attending Levi Ennis MD   Hosp Day # 0 PCP Michael Rodriguez     Chief Complaint: gen weakness    S: Patient cefTRIAXone  1 g Intravenous Q24H   • Donepezil HCl  10 mg Oral Nightly   • Venlafaxine HCl  75 mg Oral BID   • Heparin Sodium (Porcine)  5,000 Units Subcutaneous Q8H [address]       ASSESSMENT / PLAN:     1. Generalized Weakness  1. likley due to UTI  2.  IVF  3

## 2020-01-21 NOTE — HOME CARE LIAISON
Received palliative referral from [facility]. Met with patient and her family at the bedside to discuss palliative services. Patient is in agreement to a palliative referral to [facility]. Brochure provided. Will follow.

## 2020-01-21 NOTE — CONSULTS
[facility]  Report of Psychiatric Consultation    Dominique Randalls Patient Status:  Observation    1940 MRN [id number]   [facility] 4NW-A Attending Armani Gonzalez MD   Hosp Day # 2 PCP Yanet [place]     Date of Admission: 20 stopped going to [employer] per . She developed low mood and energy and motivation and was started on Effexor 75mg twice a day. Her mood improved. In 2018, her short term memory worsened. She forgot to pay bills.  She had neuro psych testing (SEROQUEL) tab 25 mg, 25 mg, Oral, Nightly  •  glycerin-Hypromellose- (ARTIFICAL TEARS) 0.2-0.2-1 % ophthalmic solution 1 drop, 1 drop, Both Eyes, QID PRN  •  Levothyroxine Sodium (SYNTHROID) tab 50 mcg, 50 mcg, Oral, Before breakfast  •  maalox/dip

## 2020-01-21 NOTE — PROGRESS NOTES
[facility] Patient Status:  Observation    1940 MRN [id number]   [facility] 4NW-A Attending Sangeetha Baum MD   Hosp Day # 0 PCP Man Hernandes     Date of visit:  2020  Day 0 of bedside. SKIN: Warm and dry.      Palliative Performance Scale: 60%   Palliative Performance Scale   % Ambulation Activity Level Self-Care Intake Consciousness   100 Full Normal Full   Normal Full   90 Full No disease  Normal Full Normal Full   80 Full Travis file.    HCPOA: Received document from family today, reviewed, sent for scan into chart.   Healthcare Agent Appointed: Yes  Healthcare Agent's Name: , [id number] Agent's Phone Number: [phone]    Psychosocial: Emotional support prov

## 2020-01-22 VITALS
DIASTOLIC BLOOD PRESSURE: 76 MMHG | BODY MASS INDEX: 26.96 KG/M2 | SYSTOLIC BLOOD PRESSURE: 148 MMHG | TEMPERATURE: 98 F | RESPIRATION RATE: 18 BRPM | OXYGEN SATURATION: 98 % | HEIGHT: 61 IN | HEART RATE: 85 BPM | WEIGHT: 142.81 LBS

## 2020-01-22 PROCEDURE — 99217 OBSERVATION CARE DISCHARGE: CPT | Performed by: HOSPITALIST

## 2020-01-22 RX ORDER — LOVASTATIN 10 MG/1
30 TABLET ORAL NIGHTLY
COMMUNITY

## 2020-01-22 RX ORDER — VENLAFAXINE HYDROCHLORIDE 75 MG/1
150 CAPSULE, EXTENDED RELEASE ORAL DAILY
COMMUNITY

## 2020-01-22 RX ORDER — LEVOFLOXACIN 500 MG/1
500 TABLET, FILM COATED ORAL DAILY
Qty: 1 TABLET | Refills: 0 | Status: SHIPPED | OUTPATIENT
Start: 2020-01-23 | End: 2020-01-24

## 2020-01-22 RX ORDER — ARIPIPRAZOLE 2 MG/1
2 TABLET ORAL DAILY
Qty: 30 TABLET | Refills: 0 | Status: ON HOLD | OUTPATIENT
Start: 2020-01-23 | End: 2020-06-25

## 2020-01-22 NOTE — PLAN OF CARE
NURSING DISCHARGE NOTE    Discharged Rehab facility via Wheelchair. Accompanied by Support staff  Belongings Taken by patient/family     Discharged to [facility]. .Nursing report given to Aj/DEEPALI

## 2020-01-22 NOTE — OCCUPATIONAL THERAPY NOTE
OCCUPATIONAL THERAPY TREATMENT NOTE - INPATIENT     Room Number: 422/422-A  Session: 1   Number of Visits to Meet Established Goals: 5    Presenting Problem: UTI    History related to current admission: Pt admitted 1/19/2020 for Dehydration [E86.0]  [place] Impairment: 46.65%  Standardized Score (AM-PAC Scale): 38.66  CMS Modifier (G-Code): CK    FUNCTIONAL TRANSFER ASSESSMENT  Supine to Sit : Contact guard assist  Sit to Stand: Contact guard assist    Skilled Therapy Provided: Pt seen semi supine, daughter p body dressing:  with supervision  Patient will perform lower body dressing:  with supervision  Patient will perform toileting: with supervision     Functional Transfer Goals  Patient will transfer from sit to supine:  with supervision  Patient will transfe

## 2020-01-22 NOTE — CM/SW NOTE
Apparently [facility] is not in network w/he pt's insurance for [employer]. Referral was sent to [facility]. Hayley from [facility] to meet w/family.

## 2020-01-22 NOTE — CM/SW NOTE
01/22/20 1200   Discharge disposition   Expected discharge disposition Skilled Nurs   Name of [address] Giraldo'   RN to call report to [facility]. Vaibhav Rg aware of dc.  to transport the pt around 3pm. Rn aware of joey.    Pablito Martinez

## 2020-01-22 NOTE — PLAN OF CARE
Problem: RISK FOR INFECTION - ADULT  Goal: Absence of fever/infection during anticipated neutropenic period  Description  INTERVENTIONS  - Monitor WBC  - Administer growth factors as ordered  - Implement neutropenic guidelines  Outcome: Progressing   Ann

## 2020-01-22 NOTE — PLAN OF CARE
Up sitting in the chair. Awake & alert,pleasant & compliant w/ meds & treatments. Remains on IV antibiotics for UTI. No adverse reactions noted. Ambualting the halls earlier today w/ rolling walker w/ physical therapy. PT still recommending LYNN.  Insurance ap

## 2020-01-22 NOTE — CM/SW NOTE
[facility] stating the pt does not qualify for PC. Family agreeable to this.

## 2020-01-22 NOTE — PROGRESS NOTES
Patient seen and examined    S- no compplaints,   General- Nad  Chest- CTAB  CVS- RRR  Abdo- soft, NT, BS+    Plan   Ok to dc to [facility] today  D/w family at bedside  Complete 5 days of Faustina Ramos M.D.   [facility]

## 2020-01-22 NOTE — PHYSICAL THERAPY NOTE
PHYSICAL THERAPY TREATMENT NOTE - INPATIENT    Room Number: 422/422-A     Session: 1   Number of Visits to Meet Established Goals: 5    Presenting Problem: UTI, acute cystitis     Problem List  Principal Problem:    Urinary tract infection without hematur Climbing 3-5 steps with a railing?: A Lot       AM-PAC Score:  Raw Score: 17   Approx Degree of Impairment: 50.57%   Standardized Score (AM-PAC Scale): 42.13   CMS Modifier (G-Code): CK    FUNCTIONAL ABILITY STATUS  Gait Assessment   Gait Assistance: Minim tolerance this session, when compared to PT Eval. Pt's family eager to d/c to [address] and SW aware of session findings.    At this time, Pt. presents with decreased balance, impaired strength, difficulty with gait/transfers resulting in downgrade of overal

## 2020-01-23 ENCOUNTER — TELEPHONE (OUTPATIENT)
Dept: INTERNAL MEDICINE | Age: 80
End: 2020-01-23

## 2020-01-23 ENCOUNTER — EXTERNAL FACILITY (OUTPATIENT)
Dept: FAMILY MEDICINE CLINIC | Facility: CLINIC | Age: 80
End: 2020-01-23

## 2020-01-23 DIAGNOSIS — F03.90 DEMENTIA WITHOUT BEHAVIORAL DISTURBANCE, UNSPECIFIED DEMENTIA TYPE (HCC): ICD-10-CM

## 2020-01-23 DIAGNOSIS — R53.1 WEAKNESS GENERALIZED: Primary | ICD-10-CM

## 2020-01-23 DIAGNOSIS — N39.0 URINARY TRACT INFECTION WITHOUT HEMATURIA, SITE UNSPECIFIED: ICD-10-CM

## 2020-01-23 PROCEDURE — 99306 1ST NF CARE HIGH MDM 50: CPT | Performed by: FAMILY MEDICINE

## 2020-01-23 NOTE — DISCHARGE SUMMARY
[facility]  DISCHARGE SUMMARY     Em Rankin Patient Status:  Observation    1940 MRN [id number]   [facility] 4NW-A Attending No att. providers found   [address] Day # 0 PCP Ramandeep Pagan     Date of Admission: 2020  Date o 30 tablet  Refills:  0     levofloxacin 500 MG Tabs  Commonly known as:  LEVAQUIN  Start taking on:  January 23, 2020  Notes to patient: This is your last dose of antibiotic      Take 1 tablet (500 mg total) by mouth daily for 1 dose.    Stop taking on:  J all extremities. Extremities: No edema.   -----------------------------------------------------------------------------------------------  PATIENT DISCHARGE INSTRUCTIONS: See electronic chart    Ariel Bender MD    Time spent:  > 30 minutes

## 2020-01-24 ENCOUNTER — INITIAL APN SNF VISIT (OUTPATIENT)
Dept: INTERNAL MEDICINE CLINIC | Age: 80
End: 2020-01-24

## 2020-01-24 DIAGNOSIS — N30.00 ACUTE CYSTITIS WITHOUT HEMATURIA: Primary | ICD-10-CM

## 2020-01-24 DIAGNOSIS — Z74.1 REQUIRES ASSISTANCE WITH ACTIVITIES OF DAILY LIVING (ADL): ICD-10-CM

## 2020-01-24 DIAGNOSIS — F03.90 DEMENTIA WITHOUT BEHAVIORAL DISTURBANCE, UNSPECIFIED DEMENTIA TYPE (HCC): ICD-10-CM

## 2020-01-24 DIAGNOSIS — Z79.899 MEDICATION MANAGEMENT: ICD-10-CM

## 2020-01-24 PROCEDURE — 99310 SBSQ NF CARE HIGH MDM 45: CPT | Performed by: NURSE PRACTITIONER

## 2020-01-24 NOTE — PROGRESS NOTES
Nikki Nurse Author: Michele Bashir MD     1940 MRN [id number]   [facility]  Admission 20      Last Hospital Discharge 20 PCP [facility] of Discharge  [facility]        CC -admitted to [address] [place] [address] for subacute reha mouth pain, neck pain, running nose, headaches or swollen glands. Skin: No rashes, pruritus or skin changes,  Respiratory: Denies cough, wheezing or shortness of breath. CV: Denies chest pain, palpitations, orthopnea, PND or dizziness.   Musculoskeletal: 1/22/20 PCP [address] of Discharge  [facility]--No chief complaint on file.       H.P.I Janice Zimmerman is a 70year old female       Wt Readings from Last 3 Encounters:  01/19/20 : 142 lb 12.8 oz (64.8 kg) taken for this visit.       GENERAL: well developed, well nourished, in no apparent distress  SKIN: no rashes, no suspicious lesions  HEENT: atraumatic, normocephalic, ears and throat are clear  EYES: PERRLA, EOMI, conjunctiva are clear  NECK: supple, no ad

## 2020-01-28 ENCOUNTER — SNF VISIT (OUTPATIENT)
Dept: INTERNAL MEDICINE CLINIC | Age: 80
End: 2020-01-28

## 2020-01-28 DIAGNOSIS — Z87.440 HISTORY OF UTI: Primary | ICD-10-CM

## 2020-01-28 DIAGNOSIS — F03.90 DEMENTIA WITHOUT BEHAVIORAL DISTURBANCE, UNSPECIFIED DEMENTIA TYPE (HCC): ICD-10-CM

## 2020-01-28 DIAGNOSIS — Z74.1 REQUIRES ASSISTANCE WITH ACTIVITIES OF DAILY LIVING (ADL): ICD-10-CM

## 2020-01-28 PROCEDURE — 99308 SBSQ NF CARE LOW MDM 20: CPT | Performed by: NURSE PRACTITIONER

## 2020-01-28 RX ORDER — DOCUSATE SODIUM 100 MG/1
100 CAPSULE, LIQUID FILLED ORAL 2 TIMES DAILY
COMMUNITY

## 2020-01-28 RX ORDER — ACETAMINOPHEN 325 MG/1
650 TABLET ORAL EVERY 6 HOURS PRN
Status: ON HOLD | COMMUNITY
End: 2020-06-26

## 2020-01-28 NOTE — PROGRESS NOTES
Azul Sensor  : 1940  Age 70year old  female patient is admitted to Facility: [address] [place] [address] [address] Residence for [address] [place] [address] date:  20  Discharge date to [facility]:  20  ELOS:  14-17 days  Anticipated discharge date:  20  Ad past surgical history on file.   Family History   Problem Relation Age of Onset   • Heart Disorder Father    • Heart Disorder Sister         A Fib     Social History    Tobacco Use      Smoking status: Never Smoker      Smokeless tobacco: Never Used    Alco well nourished, in no apparent distress  LINES, TUBES, DRAINS:  none  SKIN: no rashes, no suspicious lesions  WOUND: none  EYES: conjunctiva normal, no drainage from eyes  HENT: normocephalic; normal nose, no nasal drainage, mucous membranes pink, moist  R lab and imaging results reviewed. Medication reconciliation completed.         Assessment and Plan:  Physical Deconditioning/Weakness  PT/OT/ST to evaluate and treat  LYNN team to establish care plan meeting with patient and POA/family as appropriate  LYNN t

## 2020-01-28 NOTE — PROGRESS NOTES
Janice Zimmerman, 9/22/1940, 70year old, female    Chief Complaint:  Patient presents with:   Follow - Up  Urinary  Med Reconcilliation  [facility] Admit date:  1/19/20  Discharge date to [facility]:  1/22/20  ELOS:  14-17 days  Anticipated discharg auscultation  CARDIOVASCULAR: S1, S2 normal, RRR; no S3, no S4;   ABDOMEN: active BS+, soft, nondistended; no visible masses; nontender, no guarding  :Deferred  MUSCULOSKELETAL: weakness improving; will undergo therapies to improve strength, endurance an

## 2020-01-31 NOTE — PROGRESS NOTES
Claudine Magdaleno, 9/22/1940, 70year old, female    Chief Complaint:  Patient presents with:   Follow - Up  Urinary  Weakness     [facility] Admit date:  1/19/20  Discharge date to [facility]:  1/22/20  ELOS:  14-17 days  Anticipated discharge date:  2/4/20  A appropriate  LYNN team/ & discharge planner to assist with establishing safe discharge plan for next level of care     Hx recent UTI-abx course completed  Monitor symptoms  Encourage po intake    Bilateral pedal edema-improved  Elevate  Seema

## 2020-02-04 ENCOUNTER — SNF DISCHARGE (OUTPATIENT)
Dept: INTERNAL MEDICINE CLINIC | Age: 80
End: 2020-02-04

## 2020-02-04 DIAGNOSIS — F03.90 DEMENTIA WITHOUT BEHAVIORAL DISTURBANCE, UNSPECIFIED DEMENTIA TYPE (HCC): ICD-10-CM

## 2020-02-04 DIAGNOSIS — Z87.440 HISTORY OF UTI: Primary | ICD-10-CM

## 2020-02-04 DIAGNOSIS — Z76.0 PRESCRIPTION REFILL: ICD-10-CM

## 2020-02-04 PROCEDURE — 99316 NF DSCHRG MGMT 30 MIN+: CPT | Performed by: NURSE PRACTITIONER

## 2020-02-04 NOTE — PROGRESS NOTES
Theodora Ramíerz, 9/22/1940, 70year old, female is being discharged from Facility: [facility]    Date of Admission: 1/22/20    Date of Discharge: 2/6/20                              Admitting Diagnoses:  UTI    Reason 2  NEUROLOGIC: follows commands  PSYCHIATRIC: ---alert and oriented, good eye contact but flat affect    Skilled Nursing Facility Course:    · Patient completed course of oral abx treating UTI on 1/24  · Patient returned to baseline abilities in SNF  · [facility]

## 2020-02-13 ENCOUNTER — OFFICE VISIT (OUTPATIENT)
Dept: INTERNAL MEDICINE | Age: 80
End: 2020-02-13

## 2020-02-13 ENCOUNTER — ADVANCED DIRECTIVES (OUTPATIENT)
Dept: HEALTH INFORMATION MANAGEMENT | Facility: OTHER | Age: 80
End: 2020-02-13

## 2020-02-13 ENCOUNTER — LAB SERVICES (OUTPATIENT)
Dept: LAB | Age: 80
End: 2020-02-13

## 2020-02-13 VITALS
WEIGHT: 140 LBS | BODY MASS INDEX: 25.76 KG/M2 | DIASTOLIC BLOOD PRESSURE: 80 MMHG | TEMPERATURE: 96.6 F | HEART RATE: 73 BPM | HEIGHT: 62 IN | SYSTOLIC BLOOD PRESSURE: 120 MMHG

## 2020-02-13 DIAGNOSIS — Z09 HOSPITAL DISCHARGE FOLLOW-UP: Primary | ICD-10-CM

## 2020-02-13 DIAGNOSIS — N39.498 OTHER URINARY INCONTINENCE: ICD-10-CM

## 2020-02-13 DIAGNOSIS — Z23 FLU VACCINE NEED: ICD-10-CM

## 2020-02-13 PROBLEM — F33.2 SEVERE EPISODE OF RECURRENT MAJOR DEPRESSIVE DISORDER, WITHOUT PSYCHOTIC FEATURES (CMD): Status: ACTIVE | Noted: 2020-02-13

## 2020-02-13 PROBLEM — Z51.5 PALLIATIVE CARE ENCOUNTER: Status: ACTIVE | Noted: 2020-01-20

## 2020-02-13 PROBLEM — R53.1 WEAKNESS GENERALIZED: Status: ACTIVE | Noted: 2020-01-19

## 2020-02-13 PROBLEM — Z71.89 COUNSELING REGARDING ADVANCED CARE PLANNING AND GOALS OF CARE: Status: ACTIVE | Noted: 2020-01-20

## 2020-02-13 LAB
BACTERIA: ABNORMAL
BILIRUBIN URINE: NEGATIVE
BLOOD URINE: NEGATIVE
CLARITY: CLEAR
COLOR: YELLOW
GLUCOSE QUALITATIVE U: NEGATIVE
KETONES, URINE: NEGATIVE
LEUKOCYTE ESTERASE URINE: ABNORMAL
NITRITE URINE: NEGATIVE
PH URINE: 5.5 (ref 5–7)
RED BLOOD CELLS URINE: ABNORMAL (ref 0–3)
SPECIFIC GRAVITY URINE: 1.01 (ref 1–1.03)
SQUAMOUS EPITHELIAL CELLS: ABNORMAL
URINE PROTEIN, QUAL (DIPSTICK): NEGATIVE
UROBILINOGEN URINE: 0.2
WHITE BLOOD CELLS URINE: ABNORMAL (ref 0–5)

## 2020-02-13 PROCEDURE — G0008 ADMIN INFLUENZA VIRUS VAC: HCPCS

## 2020-02-13 PROCEDURE — 90688 IIV4 VACCINE SPLT 0.5 ML IM: CPT

## 2020-02-13 PROCEDURE — 87088 URINE BACTERIA CULTURE: CPT | Performed by: INTERNAL MEDICINE

## 2020-02-13 PROCEDURE — 87186 SC STD MICRODIL/AGAR DIL: CPT | Performed by: INTERNAL MEDICINE

## 2020-02-13 PROCEDURE — 87086 URINE CULTURE/COLONY COUNT: CPT | Performed by: INTERNAL MEDICINE

## 2020-02-13 PROCEDURE — 99213 OFFICE O/P EST LOW 20 MIN: CPT | Performed by: INTERNAL MEDICINE

## 2020-02-13 PROCEDURE — 81003 URINALYSIS AUTO W/O SCOPE: CPT | Performed by: INTERNAL MEDICINE

## 2020-02-13 RX ORDER — ARIPIPRAZOLE 2 MG/1
2 TABLET ORAL
COMMUNITY
Start: 2020-01-23 | End: 2020-02-20 | Stop reason: ALTCHOICE

## 2020-02-13 RX ORDER — DOCUSATE SODIUM 100 MG/1
100 CAPSULE, LIQUID FILLED ORAL PRN
COMMUNITY

## 2020-02-13 RX ORDER — LEVOTHYROXINE SODIUM 0.05 MG/1
50 TABLET ORAL DAILY
COMMUNITY
End: 2020-09-16

## 2020-02-13 RX ORDER — ACETAMINOPHEN 325 MG/1
500 TABLET ORAL PRN
COMMUNITY
Start: 2020-07-22 | End: 2020-07-22

## 2020-02-13 ASSESSMENT — PATIENT HEALTH QUESTIONNAIRE - PHQ9
SUM OF ALL RESPONSES TO PHQ9 QUESTIONS 1 AND 2: 0
SUM OF ALL RESPONSES TO PHQ9 QUESTIONS 1 AND 2: 0
2. FEELING DOWN, DEPRESSED OR HOPELESS: NOT AT ALL
1. LITTLE INTEREST OR PLEASURE IN DOING THINGS: NOT AT ALL

## 2020-02-15 DIAGNOSIS — N39.0 URINARY TRACT INFECTION WITHOUT HEMATURIA, SITE UNSPECIFIED: Primary | ICD-10-CM

## 2020-02-15 LAB — FINAL REPORT: ABNORMAL

## 2020-02-15 RX ORDER — LEVOFLOXACIN 500 MG/1
500 TABLET, FILM COATED ORAL DAILY
Qty: 5 TABLET | Refills: 0 | Status: SHIPPED | OUTPATIENT
Start: 2020-02-15 | End: 2020-07-31 | Stop reason: SDUPTHER

## 2020-02-15 RX ORDER — LEVOFLOXACIN 500 MG/1
500 TABLET, FILM COATED ORAL DAILY
Status: DISCONTINUED | OUTPATIENT
Start: 2020-02-15 | End: 2020-02-15

## 2020-02-20 ENCOUNTER — BEHAVIORAL HEALTH (OUTPATIENT)
Dept: BEHAVIORAL HEALTH | Age: 80
End: 2020-02-20
Attending: INTERNAL MEDICINE

## 2020-02-20 ENCOUNTER — TELEPHONE (OUTPATIENT)
Dept: INTERNAL MEDICINE | Age: 80
End: 2020-02-20

## 2020-02-20 DIAGNOSIS — G91.2 NORMAL PRESSURE HYDROCEPHALUS (CMD): Primary | ICD-10-CM

## 2020-02-20 DIAGNOSIS — F02.80 DEMENTIA ASSOCIATED WITH OTHER UNDERLYING DISEASE WITHOUT BEHAVIORAL DISTURBANCE (CMD): ICD-10-CM

## 2020-02-20 DIAGNOSIS — F34.1 PERSISTENT DEPRESSIVE DISORDER: Primary | ICD-10-CM

## 2020-02-20 DIAGNOSIS — F32.1 MODERATE MAJOR DEPRESSION (CMD): ICD-10-CM

## 2020-02-20 DIAGNOSIS — F03.90 MAJOR NEUROCOGNITIVE DISORDER (CMD): ICD-10-CM

## 2020-02-20 PROCEDURE — 90792 PSYCH DIAG EVAL W/MED SRVCS: CPT | Performed by: PSYCHIATRY & NEUROLOGY

## 2020-02-20 RX ORDER — VENLAFAXINE HYDROCHLORIDE 75 MG/1
150 CAPSULE, EXTENDED RELEASE ORAL DAILY
Qty: 60 CAPSULE | Refills: 2 | Status: SHIPPED | OUTPATIENT
Start: 2020-02-20 | End: 2020-05-22 | Stop reason: SDUPTHER

## 2020-02-20 RX ORDER — BUPROPION HYDROCHLORIDE 75 MG/1
75 TABLET ORAL DAILY
Qty: 30 TABLET | Refills: 2 | Status: SHIPPED | OUTPATIENT
Start: 2020-02-20 | End: 2020-05-22 | Stop reason: SDUPTHER

## 2020-02-26 ENCOUNTER — TELEPHONE (OUTPATIENT)
Dept: INTERNAL MEDICINE | Age: 80
End: 2020-02-26

## 2020-03-10 ENCOUNTER — TELEPHONE (OUTPATIENT)
Dept: INTERNAL MEDICINE | Age: 80
End: 2020-03-10

## 2020-03-10 DIAGNOSIS — R53.83 FATIGUE, UNSPECIFIED TYPE: Primary | ICD-10-CM

## 2020-03-10 DIAGNOSIS — R40.0 SOMNOLENCE: ICD-10-CM

## 2020-03-11 ENCOUNTER — LAB SERVICES (OUTPATIENT)
Dept: LAB | Age: 80
End: 2020-03-11

## 2020-03-11 ENCOUNTER — TELEPHONE (OUTPATIENT)
Dept: BEHAVIORAL HEALTH | Age: 80
End: 2020-03-11

## 2020-03-11 DIAGNOSIS — R53.83 FATIGUE, UNSPECIFIED TYPE: ICD-10-CM

## 2020-03-11 DIAGNOSIS — R40.0 SOMNOLENCE: ICD-10-CM

## 2020-03-11 LAB
BILIRUBIN URINE: ABNORMAL
BLOOD URINE: NEGATIVE
CLARITY: CLEAR
COLOR: ABNORMAL
GLUCOSE QUALITATIVE U: NEGATIVE
KETONES, URINE: 15
LEUKOCYTE ESTERASE URINE: NEGATIVE
NITRITE URINE: NEGATIVE
PH URINE: 5.5 (ref 5–7)
SPECIFIC GRAVITY URINE: >=1.03 (ref 1–1.03)
URINE PROTEIN, QUAL (DIPSTICK): NEGATIVE
UROBILINOGEN URINE: 0.2

## 2020-03-11 PROCEDURE — 81003 URINALYSIS AUTO W/O SCOPE: CPT | Performed by: INTERNAL MEDICINE

## 2020-03-12 ENCOUNTER — E-ADVICE (OUTPATIENT)
Dept: PSYCHOLOGY | Age: 80
End: 2020-03-12

## 2020-03-12 ENCOUNTER — E-ADVICE (OUTPATIENT)
Dept: INTERNAL MEDICINE | Age: 80
End: 2020-03-12

## 2020-03-12 ENCOUNTER — TELEPHONE (OUTPATIENT)
Dept: NEUROSURGERY | Age: 80
End: 2020-03-12

## 2020-03-12 ENCOUNTER — TELEPHONE (OUTPATIENT)
Dept: BEHAVIORAL HEALTH | Age: 80
End: 2020-03-12

## 2020-03-12 DIAGNOSIS — G91.2 NORMAL PRESSURE HYDROCEPHALUS (CMD): Primary | ICD-10-CM

## 2020-03-12 DIAGNOSIS — F02.80 DEMENTIA ASSOCIATED WITH OTHER UNDERLYING DISEASE WITHOUT BEHAVIORAL DISTURBANCE (CMD): ICD-10-CM

## 2020-03-13 ENCOUNTER — TELEPHONE (OUTPATIENT)
Dept: PSYCHOLOGY | Age: 80
End: 2020-03-13

## 2020-03-13 ENCOUNTER — TELEPHONE (OUTPATIENT)
Dept: SCHEDULING | Age: 80
End: 2020-03-13

## 2020-03-19 ENCOUNTER — TELEPHONE (OUTPATIENT)
Dept: PSYCHOLOGY | Age: 80
End: 2020-03-19

## 2020-03-19 DIAGNOSIS — F02.80 DEMENTIA ASSOCIATED WITH OTHER UNDERLYING DISEASE WITHOUT BEHAVIORAL DISTURBANCE (CMD): ICD-10-CM

## 2020-03-19 DIAGNOSIS — F03.91 DEMENTIA WITH BEHAVIORAL DISTURBANCE, UNSPECIFIED DEMENTIA TYPE: Primary | ICD-10-CM

## 2020-03-19 DIAGNOSIS — R53.1 WEAKNESS GENERALIZED: ICD-10-CM

## 2020-03-24 ENCOUNTER — EXTERNAL RECORD (OUTPATIENT)
Dept: HEALTH INFORMATION MANAGEMENT | Facility: OTHER | Age: 80
End: 2020-03-24

## 2020-03-31 DIAGNOSIS — E78.2 MIXED HYPERLIPIDEMIA: ICD-10-CM

## 2020-03-31 DIAGNOSIS — E03.9 HYPOTHYROIDISM, UNSPECIFIED TYPE: Primary | ICD-10-CM

## 2020-03-31 RX ORDER — LOVASTATIN 10 MG/1
TABLET ORAL
Qty: 90 TABLET | Refills: 0 | Status: SHIPPED | OUTPATIENT
Start: 2020-03-31 | End: 2020-06-26

## 2020-03-31 RX ORDER — LOVASTATIN 20 MG/1
TABLET ORAL
Qty: 90 TABLET | Refills: 0 | Status: SHIPPED | OUTPATIENT
Start: 2020-03-31 | End: 2020-06-26

## 2020-04-02 ENCOUNTER — E-ADVICE (OUTPATIENT)
Dept: INTERNAL MEDICINE | Age: 80
End: 2020-04-02

## 2020-04-02 ENCOUNTER — TELEPHONE (OUTPATIENT)
Dept: NEUROSURGERY | Age: 80
End: 2020-04-02

## 2020-04-14 ENCOUNTER — APPOINTMENT (OUTPATIENT)
Dept: NEUROSURGERY | Age: 80
End: 2020-04-14
Attending: INTERNAL MEDICINE

## 2020-05-04 ENCOUNTER — IMAGING SERVICES (OUTPATIENT)
Dept: OTHER | Age: 80
End: 2020-05-04

## 2020-05-04 ENCOUNTER — EXTERNAL RECORD (OUTPATIENT)
Dept: HEALTH INFORMATION MANAGEMENT | Facility: OTHER | Age: 80
End: 2020-05-04

## 2020-05-05 ENCOUNTER — E-ADVICE (OUTPATIENT)
Dept: INTERNAL MEDICINE | Age: 80
End: 2020-05-05

## 2020-05-05 DIAGNOSIS — G93.89 BRAIN MASS: ICD-10-CM

## 2020-05-05 DIAGNOSIS — R90.89 ABNORMAL FINDING ON MRI OF BRAIN: Primary | ICD-10-CM

## 2020-05-11 ENCOUNTER — TELEPHONE (OUTPATIENT)
Dept: INTERNAL MEDICINE | Age: 80
End: 2020-05-11

## 2020-05-12 ENCOUNTER — TELEPHONE (OUTPATIENT)
Dept: INTERNAL MEDICINE | Age: 80
End: 2020-05-12

## 2020-05-12 ENCOUNTER — E-ADVICE (OUTPATIENT)
Dept: FAMILY MEDICINE | Age: 80
End: 2020-05-12

## 2020-05-12 ENCOUNTER — E-ADVICE (OUTPATIENT)
Dept: INTERNAL MEDICINE | Age: 80
End: 2020-05-12

## 2020-05-19 ENCOUNTER — EXTERNAL RECORD (OUTPATIENT)
Dept: HEALTH INFORMATION MANAGEMENT | Facility: OTHER | Age: 80
End: 2020-05-19

## 2020-05-19 ENCOUNTER — V-VISIT (OUTPATIENT)
Dept: FAMILY MEDICINE | Age: 80
End: 2020-05-19

## 2020-05-19 ENCOUNTER — OFFICE VISIT (OUTPATIENT)
Dept: FAMILY MEDICINE | Age: 80
End: 2020-05-19

## 2020-05-19 ENCOUNTER — IMAGING SERVICES (OUTPATIENT)
Dept: OTHER | Age: 80
End: 2020-05-19

## 2020-05-19 ENCOUNTER — TELEPHONE (OUTPATIENT)
Dept: FAMILY MEDICINE | Age: 80
End: 2020-05-19

## 2020-05-19 VITALS
DIASTOLIC BLOOD PRESSURE: 66 MMHG | SYSTOLIC BLOOD PRESSURE: 125 MMHG | WEIGHT: 139 LBS | HEART RATE: 63 BPM | BODY MASS INDEX: 27.15 KG/M2 | TEMPERATURE: 99.3 F

## 2020-05-19 DIAGNOSIS — F32.0 MILD MAJOR DEPRESSION (CMD): ICD-10-CM

## 2020-05-19 DIAGNOSIS — R41.3 MEMORY LOSS: Primary | ICD-10-CM

## 2020-05-19 DIAGNOSIS — F02.80 DEMENTIA ASSOCIATED WITH OTHER UNDERLYING DISEASE WITHOUT BEHAVIORAL DISTURBANCE (CMD): Primary | ICD-10-CM

## 2020-05-19 DIAGNOSIS — G91.2 NORMAL PRESSURE HYDROCEPHALUS (CMD): ICD-10-CM

## 2020-05-19 DIAGNOSIS — E03.9 ACQUIRED HYPOTHYROIDISM: ICD-10-CM

## 2020-05-19 PROCEDURE — 99214 OFFICE O/P EST MOD 30 MIN: CPT | Performed by: INTERNAL MEDICINE

## 2020-05-21 ENCOUNTER — TELEPHONE (OUTPATIENT)
Dept: INTERNAL MEDICINE | Age: 80
End: 2020-05-21

## 2020-05-21 ENCOUNTER — LAB SERVICES (OUTPATIENT)
Dept: LAB | Age: 80
End: 2020-05-21

## 2020-05-21 DIAGNOSIS — F32.0 MILD MAJOR DEPRESSION (CMD): ICD-10-CM

## 2020-05-21 DIAGNOSIS — E03.9 ACQUIRED HYPOTHYROIDISM: ICD-10-CM

## 2020-05-21 DIAGNOSIS — D32.9 MENINGIOMA (CMD): Primary | ICD-10-CM

## 2020-05-21 DIAGNOSIS — F02.80 DEMENTIA ASSOCIATED WITH OTHER UNDERLYING DISEASE WITHOUT BEHAVIORAL DISTURBANCE (CMD): ICD-10-CM

## 2020-05-21 LAB
ALBUMIN SERPL BCG-MCNC: 4.3 G/DL (ref 3.6–5.1)
ALP SERPL-CCNC: 53 U/L (ref 45–130)
ALT SERPL W/O P-5'-P-CCNC: 15 U/L (ref 7–34)
AST SERPL-CCNC: 14 U/L (ref 9–37)
BASOPHIL %: 0 % (ref 0–1.2)
BASOPHIL ABSOLUTE #: 0 10*3/UL (ref 0–0.1)
BILIRUB SERPL-MCNC: 0.3 MG/DL (ref 0–1)
BILIRUBIN URINE: NEGATIVE
BLOOD URINE: NEGATIVE
BUN SERPL-MCNC: 31 MG/DL (ref 7–20)
CALCIUM SERPL-MCNC: 10.7 MG/DL (ref 8.6–10.6)
CHLORIDE SERPL-SCNC: 99 MMOL/L (ref 96–107)
CLARITY: ABNORMAL
COLOR: ABNORMAL
CREAT SERPL-MCNC: 1 MG/DL (ref 0.5–1.4)
DIFFERENTIAL TYPE: ABNORMAL
EOSINOPHIL %: 0 % (ref 0–10)
EOSINOPHIL ABSOLUTE #: 0 10*3/UL (ref 0–0.5)
GFR SERPL CREATININE-BSD FRML MDRD: 56 ML/MIN/{1.73M2}
GFR SERPL CREATININE-BSD FRML MDRD: >60 ML/MIN/{1.73M2}
GLUCOSE QUALITATIVE U: NEGATIVE
GLUCOSE SERPL-MCNC: 101 MG/DL (ref 70–200)
HCO3 SERPL-SCNC: 32 MMOL/L (ref 22–32)
HEMATOCRIT: 43 % (ref 34–45)
HEMOGLOBIN: 13.3 G/DL (ref 11.2–15.7)
KETONES, URINE: 20
LEUKOCYTE ESTERASE URINE: ABNORMAL
LYMPH PERCENT: 21.7 % (ref 20.5–51.1)
LYMPHOCYTE ABSOLUTE #: 1.9 10*3/UL (ref 1.2–3.4)
MAGNESIUM SERPL-MCNC: 2.1 MG/DL (ref 1.7–2.6)
MEAN CORPUSCULAR HGB CONCENTRATION: 30.9 % (ref 32–36)
MEAN CORPUSCULAR HGB: 29.2 PG (ref 27–34)
MEAN CORPUSCULAR VOLUME: 94.3 FL (ref 79–95)
MEAN PLATELET VOLUME: 9.7 FL (ref 8.6–12.4)
MONOCYTE ABSOLUTE #: 0.9 10*3/UL (ref 0.2–0.9)
MONOCYTE PERCENT: 10.6 % (ref 4.3–12.9)
MUCOUS: ABNORMAL
NEUTROPHIL ABSOLUTE #: 5.8 10*3/UL (ref 1.4–6.5)
NEUTROPHIL PERCENT: 67.7 % (ref 34–73.5)
NITRITE URINE: NEGATIVE
PH URINE: 5 (ref 5–7)
PLATELET COUNT: 265 10*3/UL (ref 150–400)
POTASSIUM SERPL-SCNC: 4.2 MMOL/L (ref 3.5–5.3)
PROT SERPL-MCNC: 7.6 G/DL (ref 6.2–8.1)
RED BLOOD CELL COUNT: 4.56 10*6/UL (ref 3.7–5.2)
RED BLOOD CELLS URINE: ABNORMAL (ref 0–3)
RED CELL DISTRIBUTION WIDTH: 13.5 % (ref 11.3–14.8)
SODIUM SERPL-SCNC: 140 MMOL/L (ref 136–146)
SPECIFIC GRAVITY URINE: 1.03 (ref 1–1.03)
SQUAMOUS EPITHELIAL CELLS: ABNORMAL
TSH SERPL DL<=0.05 MIU/L-ACNC: 0.22 M[IU]/L (ref 0.3–4.82)
U CALCIUM OXALATE CRYSTALS: ABNORMAL
URINE PROTEIN, QUAL (DIPSTICK): 30
UROBILINOGEN URINE: <2
VIT B12 SERPL-MCNC: 268 PG/ML (ref 193–982)
WHITE BLOOD CELL COUNT: 8.6 10*3/UL (ref 4–10)
WHITE BLOOD CELLS URINE: ABNORMAL (ref 0–5)

## 2020-05-21 PROCEDURE — 84443 ASSAY THYROID STIM HORMONE: CPT | Performed by: INTERNAL MEDICINE

## 2020-05-21 PROCEDURE — 36415 COLL VENOUS BLD VENIPUNCTURE: CPT | Performed by: INTERNAL MEDICINE

## 2020-05-21 PROCEDURE — 81003 URINALYSIS AUTO W/O SCOPE: CPT | Performed by: INTERNAL MEDICINE

## 2020-05-21 PROCEDURE — 80053 COMPREHEN METABOLIC PANEL: CPT | Performed by: INTERNAL MEDICINE

## 2020-05-21 PROCEDURE — 82607 VITAMIN B-12: CPT | Performed by: INTERNAL MEDICINE

## 2020-05-21 PROCEDURE — 85025 COMPLETE CBC W/AUTO DIFF WBC: CPT | Performed by: INTERNAL MEDICINE

## 2020-05-21 PROCEDURE — 87086 URINE CULTURE/COLONY COUNT: CPT | Performed by: INTERNAL MEDICINE

## 2020-05-21 PROCEDURE — 83735 ASSAY OF MAGNESIUM: CPT | Performed by: INTERNAL MEDICINE

## 2020-05-22 ENCOUNTER — E-ADVICE (OUTPATIENT)
Dept: INTERNAL MEDICINE | Age: 80
End: 2020-05-22

## 2020-05-22 DIAGNOSIS — G93.89 BRAIN MASS: ICD-10-CM

## 2020-05-22 DIAGNOSIS — F32.1 MODERATE MAJOR DEPRESSION (CMD): ICD-10-CM

## 2020-05-22 DIAGNOSIS — R90.89 ABNORMAL FINDING ON MRI OF BRAIN: ICD-10-CM

## 2020-05-22 LAB — FINAL REPORT: NORMAL

## 2020-05-22 RX ORDER — BUPROPION HYDROCHLORIDE 75 MG/1
75 TABLET ORAL DAILY
Qty: 30 TABLET | Refills: 0 | Status: SHIPPED | OUTPATIENT
Start: 2020-05-22 | End: 2020-05-27 | Stop reason: ALTCHOICE

## 2020-05-22 RX ORDER — VENLAFAXINE HYDROCHLORIDE 75 MG/1
150 CAPSULE, EXTENDED RELEASE ORAL DAILY
Qty: 60 CAPSULE | Refills: 0 | Status: SHIPPED | OUTPATIENT
Start: 2020-05-22 | End: 2020-06-17

## 2020-05-27 ENCOUNTER — V-VISIT (OUTPATIENT)
Dept: BEHAVIORAL HEALTH | Age: 80
End: 2020-05-27

## 2020-05-27 DIAGNOSIS — F34.1 PERSISTENT DEPRESSIVE DISORDER: Primary | ICD-10-CM

## 2020-05-27 DIAGNOSIS — F03.90 MAJOR NEUROCOGNITIVE DISORDER (CMD): ICD-10-CM

## 2020-05-27 PROCEDURE — 99214 OFFICE O/P EST MOD 30 MIN: CPT | Performed by: PSYCHIATRY & NEUROLOGY

## 2020-05-27 RX ORDER — METHYLPHENIDATE HYDROCHLORIDE 10 MG/1
10 TABLET ORAL
Qty: 30 TABLET | Refills: 0 | Status: SHIPPED | OUTPATIENT
Start: 2020-05-27 | End: 2020-06-23

## 2020-06-04 ENCOUNTER — TELEPHONE (OUTPATIENT)
Dept: NEUROSURGERY | Age: 80
End: 2020-06-04

## 2020-06-04 ENCOUNTER — V-VISIT (OUTPATIENT)
Dept: NEUROSURGERY | Age: 80
End: 2020-06-04
Attending: INTERNAL MEDICINE

## 2020-06-04 DIAGNOSIS — G91.2 NORMAL PRESSURE HYDROCEPHALUS (CMD): Primary | ICD-10-CM

## 2020-06-04 DIAGNOSIS — R26.9 GAIT DISORDER: ICD-10-CM

## 2020-06-04 PROCEDURE — 99204 OFFICE O/P NEW MOD 45 MIN: CPT | Performed by: NEUROLOGICAL SURGERY

## 2020-06-15 ENCOUNTER — TELEPHONE (OUTPATIENT)
Dept: PREADMISSION TESTING | Age: 80
End: 2020-06-15

## 2020-06-15 ENCOUNTER — TELEPHONE (OUTPATIENT)
Dept: NEUROSURGERY | Age: 80
End: 2020-06-15

## 2020-06-15 ENCOUNTER — LAB SERVICES (OUTPATIENT)
Dept: LAB | Age: 80
End: 2020-06-15

## 2020-06-15 VITALS — BODY MASS INDEX: 26.43 KG/M2 | WEIGHT: 140 LBS | HEIGHT: 61 IN

## 2020-06-15 DIAGNOSIS — E03.9 HYPOTHYROIDISM, UNSPECIFIED TYPE: ICD-10-CM

## 2020-06-15 DIAGNOSIS — E78.2 MIXED HYPERLIPIDEMIA: ICD-10-CM

## 2020-06-15 DIAGNOSIS — R26.9 GAIT DISORDER: ICD-10-CM

## 2020-06-15 DIAGNOSIS — G91.2 NORMAL PRESSURE HYDROCEPHALUS (CMD): Primary | ICD-10-CM

## 2020-06-15 LAB
ALBUMIN SERPL BCG-MCNC: 4.5 G/DL (ref 3.6–5.1)
ALP SERPL-CCNC: 54 U/L (ref 45–130)
ALT SERPL W/O P-5'-P-CCNC: 10 U/L (ref 7–34)
AST SERPL-CCNC: 13 U/L (ref 9–37)
BILIRUB SERPL-MCNC: 0.4 MG/DL (ref 0–1)
BUN SERPL-MCNC: 27 MG/DL (ref 7–20)
CALCIUM SERPL-MCNC: 10.3 MG/DL (ref 8.6–10.6)
CHLORIDE SERPL-SCNC: 105 MMOL/L (ref 96–107)
CHOLEST SERPL-MCNC: 155 MG/DL (ref 140–200)
CREAT SERPL-MCNC: 1.1 MG/DL (ref 0.5–1.4)
GFR SERPL CREATININE-BSD FRML MDRD: 48 ML/MIN/{1.73M2}
GFR SERPL CREATININE-BSD FRML MDRD: 58 ML/MIN/{1.73M2}
GLUCOSE P FAST SERPL-MCNC: 108 MG/DL (ref 60–100)
HCO3 SERPL-SCNC: 29 MMOL/L (ref 22–32)
HDLC SERPL-MCNC: 56 MG/DL
LDLC SERPL CALC-MCNC: 72 MG/DL (ref 0–100)
POTASSIUM SERPL-SCNC: 4.6 MMOL/L (ref 3.5–5.3)
PROT SERPL-MCNC: 7.1 G/DL (ref 6.2–8.1)
SODIUM SERPL-SCNC: 141 MMOL/L (ref 136–146)
T4 FREE SERPL-MCNC: 1.22 NG/DL (ref 0.78–2.19)
TRIGL SERPL-MCNC: 131 MG/DL (ref 0–200)
TSH SERPL DL<=0.05 MIU/L-ACNC: 0.2 M[IU]/L (ref 0.3–4.82)

## 2020-06-15 PROCEDURE — 84439 ASSAY OF FREE THYROXINE: CPT | Performed by: INTERNAL MEDICINE

## 2020-06-15 PROCEDURE — 80061 LIPID PANEL: CPT | Performed by: INTERNAL MEDICINE

## 2020-06-15 PROCEDURE — 36415 COLL VENOUS BLD VENIPUNCTURE: CPT | Performed by: INTERNAL MEDICINE

## 2020-06-15 PROCEDURE — 80053 COMPREHEN METABOLIC PANEL: CPT | Performed by: INTERNAL MEDICINE

## 2020-06-15 PROCEDURE — 84443 ASSAY THYROID STIM HORMONE: CPT | Performed by: INTERNAL MEDICINE

## 2020-06-15 SDOH — HEALTH STABILITY: MENTAL HEALTH: HOW OFTEN DO YOU HAVE A DRINK CONTAINING ALCOHOL?: NEVER

## 2020-06-15 ASSESSMENT — ACTIVITIES OF DAILY LIVING (ADL)
BATHING: NEEDS ASSISTANCE
SENSORY_SUPPORT_DEVICES: EYEGLASSES
CONTINENCE: NEEDS ASSISTANCE
TRANSFERRING: NEEDS ASSISTANCE
ADL_BEFORE_ADMISSION: NEEDS/REQUIRES ASSISTANCE
TOILETING: NEEDS ASSISTANCE
DRESSING: NEEDS ASSISTANCE
FEEDING: NEEDS ASSISTANCE
ADL_SCORE: 6

## 2020-06-16 DIAGNOSIS — Z01.812 PRE-PROCEDURAL LABORATORY EXAMINATION: Primary | ICD-10-CM

## 2020-06-17 ENCOUNTER — LAB SERVICES (OUTPATIENT)
Dept: LAB | Age: 80
End: 2020-06-17

## 2020-06-17 DIAGNOSIS — F32.1 MODERATE MAJOR DEPRESSION (CMD): ICD-10-CM

## 2020-06-17 DIAGNOSIS — Z01.812 PRE-PROCEDURAL LABORATORY EXAMINATION: ICD-10-CM

## 2020-06-17 LAB
SARS-COV-2 RNA RESP QL NAA+PROBE: NOT DETECTED
SERVICE CMNT-IMP: NORMAL
SPECIMEN SOURCE: NORMAL

## 2020-06-17 PROCEDURE — U0003 INFECTIOUS AGENT DETECTION BY NUCLEIC ACID (DNA OR RNA); SEVERE ACUTE RESPIRATORY SYNDROME CORONAVIRUS 2 (SARS-COV-2) (CORONAVIRUS DISEASE [COVID-19]), AMPLIFIED PROBE TECHNIQUE, MAKING USE OF HIGH THROUGHPUT TECHNOLOGIES AS DESCRIBED BY CMS-2020-01-R: HCPCS | Performed by: RADIOLOGY

## 2020-06-17 RX ORDER — BUPROPION HYDROCHLORIDE 75 MG/1
TABLET ORAL
Qty: 30 TABLET | Refills: 0 | OUTPATIENT
Start: 2020-06-17

## 2020-06-17 RX ORDER — VENLAFAXINE HYDROCHLORIDE 75 MG/1
CAPSULE, EXTENDED RELEASE ORAL
Qty: 60 CAPSULE | Refills: 0 | Status: SHIPPED | OUTPATIENT
Start: 2020-06-17 | End: 2020-09-16 | Stop reason: DRUGHIGH

## 2020-06-18 ENCOUNTER — MED REC SCAN ONLY (OUTPATIENT)
Dept: FAMILY MEDICINE CLINIC | Facility: CLINIC | Age: 80
End: 2020-06-18

## 2020-06-18 ENCOUNTER — HOSPITAL ENCOUNTER (OUTPATIENT)
Dept: INTERVENTIONAL RADIOLOGY/VASCULAR | Age: 80
Discharge: HOME | End: 2020-06-18
Attending: NEUROLOGICAL SURGERY

## 2020-06-18 ENCOUNTER — HOSPITAL ENCOUNTER (INPATIENT)
Dept: INTERVENTIONAL RADIOLOGY/VASCULAR | Age: 80
LOS: 2 days | Discharge: HOME | DRG: 057 | End: 2020-06-20
Admitting: NEUROLOGICAL SURGERY

## 2020-06-18 DIAGNOSIS — E03.9 HYPOTHYROIDISM, UNSPECIFIED TYPE: ICD-10-CM

## 2020-06-18 DIAGNOSIS — E78.5 DYSLIPIDEMIA: ICD-10-CM

## 2020-06-18 DIAGNOSIS — G91.2 NORMAL PRESSURE HYDROCEPHALUS (CMD): ICD-10-CM

## 2020-06-18 DIAGNOSIS — R26.9 GAIT DISORDER: ICD-10-CM

## 2020-06-18 DIAGNOSIS — F32.A DEPRESSION, UNSPECIFIED DEPRESSION TYPE: ICD-10-CM

## 2020-06-18 LAB
APTT PPP: 27 SEC (ref 22–32)
BASOPHILS # BLD: 0 K/MCL (ref 0–0.3)
BASOPHILS NFR BLD: 0 %
DIFFERENTIAL METHOD BLD: ABNORMAL
EOSINOPHIL # BLD: 0 K/MCL (ref 0.1–0.5)
EOSINOPHIL NFR BLD: 0 %
ERYTHROCYTE [DISTWIDTH] IN BLOOD: 13.2 % (ref 11–15)
HCT VFR BLD CALC: 37.1 % (ref 36–46.5)
HGB BLD-MCNC: 12.4 G/DL (ref 12–15.5)
IMM GRANULOCYTES # BLD AUTO: 0 K/MCL (ref 0–0.2)
IMM GRANULOCYTES NFR BLD: 0 %
INR PPP: 1
LYMPHOCYTES # BLD: 1.9 K/MCL (ref 1–4)
LYMPHOCYTES NFR BLD: 26 %
MCH RBC QN AUTO: 30.2 PG (ref 26–34)
MCHC RBC AUTO-ENTMCNC: 33.4 G/DL (ref 32–36.5)
MCV RBC AUTO: 90.3 FL (ref 78–100)
MONOCYTES # BLD: 0.8 K/MCL (ref 0.3–0.9)
MONOCYTES NFR BLD: 11 %
NEUTROPHILS # BLD: 4.6 K/MCL (ref 1.8–7.7)
NEUTROPHILS NFR BLD: 63 %
NRBC BLD MANUAL-RTO: 0 /100 WBC
PLATELET # BLD: 198 K/MCL (ref 140–450)
PROTHROMBIN TIME: 10.5 SEC (ref 9.7–11.8)
RBC # BLD: 4.11 MIL/MCL (ref 4–5.2)
WBC # BLD: 7.3 K/MCL (ref 4.2–11)

## 2020-06-18 PROCEDURE — C1729 CATH, DRAINAGE: HCPCS

## 2020-06-18 PROCEDURE — 99223 1ST HOSP IP/OBS HIGH 75: CPT | Performed by: FAMILY MEDICINE

## 2020-06-18 PROCEDURE — 10000008 HB ROOM CHARGE ICU OR CCU

## 2020-06-18 PROCEDURE — 36415 COLL VENOUS BLD VENIPUNCTURE: CPT

## 2020-06-18 PROCEDURE — 62272 THER SPI PNXR DRG CSF: CPT

## 2020-06-18 PROCEDURE — 10002801 HB RX 250 W/O HCPCS: Performed by: SURGERY

## 2020-06-18 PROCEDURE — 85025 COMPLETE CBC W/AUTO DIFF WBC: CPT

## 2020-06-18 PROCEDURE — 10004651 HB RX, NO CHARGE ITEM: Performed by: NURSE PRACTITIONER

## 2020-06-18 PROCEDURE — 10002801 HB RX 250 W/O HCPCS: Performed by: RADIOLOGY

## 2020-06-18 PROCEDURE — 85610 PROTHROMBIN TIME: CPT

## 2020-06-18 PROCEDURE — 10002803 HB RX 637: Performed by: SURGERY

## 2020-06-18 PROCEDURE — 85730 THROMBOPLASTIN TIME PARTIAL: CPT

## 2020-06-18 PROCEDURE — 99291 CRITICAL CARE FIRST HOUR: CPT | Performed by: SURGERY

## 2020-06-18 PROCEDURE — 009Y30Z DRAINAGE OF LUMBAR SPINAL CORD WITH DRAINAGE DEVICE, PERCUTANEOUS APPROACH: ICD-10-PCS | Performed by: RADIOLOGY

## 2020-06-18 PROCEDURE — 99221 1ST HOSP IP/OBS SF/LOW 40: CPT | Performed by: NURSE PRACTITIONER

## 2020-06-18 PROCEDURE — 10002800 HB RX 250 W HCPCS: Performed by: RADIOLOGY

## 2020-06-18 PROCEDURE — 99152 MOD SED SAME PHYS/QHP 5/>YRS: CPT

## 2020-06-18 PROCEDURE — 10006023 HB SUPPLY 272

## 2020-06-18 PROCEDURE — 62329 THER SPI PNXR CSF FLUOR/CT: CPT

## 2020-06-18 PROCEDURE — 99497 ADVNCD CARE PLAN 30 MIN: CPT | Performed by: FAMILY MEDICINE

## 2020-06-18 PROCEDURE — 99153 MOD SED SAME PHYS/QHP EA: CPT

## 2020-06-18 RX ORDER — VENLAFAXINE HYDROCHLORIDE 75 MG/1
150 CAPSULE, EXTENDED RELEASE ORAL
Status: DISCONTINUED | OUTPATIENT
Start: 2020-06-18 | End: 2020-06-20 | Stop reason: HOSPADM

## 2020-06-18 RX ORDER — POTASSIUM CHLORIDE 20 MEQ/1
40 TABLET, EXTENDED RELEASE ORAL EVERY 4 HOURS PRN
Status: DISCONTINUED | OUTPATIENT
Start: 2020-06-18 | End: 2020-06-20 | Stop reason: HOSPADM

## 2020-06-18 RX ORDER — DOCUSATE SODIUM 100 MG/1
100 CAPSULE, LIQUID FILLED ORAL DAILY
Status: DISCONTINUED | OUTPATIENT
Start: 2020-06-18 | End: 2020-06-20 | Stop reason: HOSPADM

## 2020-06-18 RX ORDER — LIDOCAINE HYDROCHLORIDE 10 MG/ML
INJECTION, SOLUTION INFILTRATION; PERINEURAL PRN
Status: COMPLETED | OUTPATIENT
Start: 2020-06-18 | End: 2020-06-18

## 2020-06-18 RX ORDER — PRAVASTATIN SODIUM 20 MG
20 TABLET ORAL NIGHTLY
Status: DISCONTINUED | OUTPATIENT
Start: 2020-06-18 | End: 2020-06-20 | Stop reason: HOSPADM

## 2020-06-18 RX ORDER — SODIUM CHLORIDE 9 MG/ML
INJECTION, SOLUTION INTRAVENOUS CONTINUOUS PRN
Status: DISCONTINUED | OUTPATIENT
Start: 2020-06-18 | End: 2020-06-20 | Stop reason: HOSPADM

## 2020-06-18 RX ORDER — ENOXAPARIN SODIUM 100 MG/ML
40 INJECTION SUBCUTANEOUS DAILY
Status: DISCONTINUED | OUTPATIENT
Start: 2020-06-19 | End: 2020-06-18

## 2020-06-18 RX ORDER — 0.9 % SODIUM CHLORIDE 0.9 %
2 VIAL (ML) INJECTION EVERY 12 HOURS SCHEDULED
Status: DISCONTINUED | OUTPATIENT
Start: 2020-06-18 | End: 2020-06-20 | Stop reason: HOSPADM

## 2020-06-18 RX ORDER — POTASSIUM CHLORIDE 14.9 MG/ML
20 INJECTION INTRAVENOUS EVERY 4 HOURS PRN
Status: DISCONTINUED | OUTPATIENT
Start: 2020-06-18 | End: 2020-06-20 | Stop reason: HOSPADM

## 2020-06-18 RX ORDER — ENOXAPARIN SODIUM 100 MG/ML
30 INJECTION SUBCUTANEOUS EVERY 24 HOURS
Status: DISCONTINUED | OUTPATIENT
Start: 2020-06-19 | End: 2020-06-20 | Stop reason: HOSPADM

## 2020-06-18 RX ORDER — POTASSIUM CHLORIDE 1.5 G/1.58G
20 POWDER, FOR SOLUTION ORAL EVERY 4 HOURS PRN
Status: DISCONTINUED | OUTPATIENT
Start: 2020-06-18 | End: 2020-06-20 | Stop reason: HOSPADM

## 2020-06-18 RX ORDER — MAGNESIUM SULFATE 1 G/100ML
1 INJECTION INTRAVENOUS DAILY PRN
Status: DISCONTINUED | OUTPATIENT
Start: 2020-06-18 | End: 2020-06-20 | Stop reason: HOSPADM

## 2020-06-18 RX ORDER — MIDAZOLAM HYDROCHLORIDE 1 MG/ML
INJECTION, SOLUTION INTRAMUSCULAR; INTRAVENOUS PRN
Status: COMPLETED | OUTPATIENT
Start: 2020-06-18 | End: 2020-06-18

## 2020-06-18 RX ORDER — ACETAMINOPHEN 325 MG/1
650 TABLET ORAL PRN
Status: DISCONTINUED | OUTPATIENT
Start: 2020-06-18 | End: 2020-06-20 | Stop reason: HOSPADM

## 2020-06-18 RX ORDER — POTASSIUM CHLORIDE 20 MEQ/1
20 TABLET, EXTENDED RELEASE ORAL EVERY 4 HOURS PRN
Status: DISCONTINUED | OUTPATIENT
Start: 2020-06-18 | End: 2020-06-20 | Stop reason: HOSPADM

## 2020-06-18 RX ORDER — LEVOTHYROXINE SODIUM 0.05 MG/1
50 TABLET ORAL DAILY
Status: DISCONTINUED | OUTPATIENT
Start: 2020-06-18 | End: 2020-06-20 | Stop reason: HOSPADM

## 2020-06-18 RX ORDER — POTASSIUM CHLORIDE 1.5 G/1.58G
40 POWDER, FOR SOLUTION ORAL EVERY 4 HOURS PRN
Status: DISCONTINUED | OUTPATIENT
Start: 2020-06-18 | End: 2020-06-20 | Stop reason: HOSPADM

## 2020-06-18 RX ADMIN — LEVOTHYROXINE SODIUM 50 MCG: 50 TABLET ORAL at 16:18

## 2020-06-18 RX ADMIN — SODIUM CHLORIDE, PRESERVATIVE FREE 2 ML: 5 INJECTION INTRAVENOUS at 20:50

## 2020-06-18 RX ADMIN — MIDAZOLAM HYDROCHLORIDE 0.5 MG: 1 INJECTION, SOLUTION INTRAMUSCULAR; INTRAVENOUS at 08:38

## 2020-06-18 RX ADMIN — PRAVASTATIN SODIUM 20 MG: 20 TABLET ORAL at 20:49

## 2020-06-18 RX ADMIN — SODIUM CHLORIDE, PRESERVATIVE FREE 2 ML: 5 INJECTION INTRAVENOUS at 16:35

## 2020-06-18 RX ADMIN — VENLAFAXINE HYDROCHLORIDE 150 MG: 75 CAPSULE, EXTENDED RELEASE ORAL at 16:17

## 2020-06-18 RX ADMIN — LIDOCAINE HYDROCHLORIDE 10 ML: 10 INJECTION, SOLUTION INFILTRATION; PERINEURAL at 08:49

## 2020-06-18 RX ADMIN — Medication 1 TABLET: at 16:18

## 2020-06-18 RX ADMIN — DOCUSATE SODIUM 100 MG: 100 CAPSULE, LIQUID FILLED ORAL at 16:35

## 2020-06-18 RX ADMIN — FENTANYL CITRATE 50 MCG: 50 INJECTION INTRAMUSCULAR; INTRAVENOUS at 08:38

## 2020-06-18 SDOH — HEALTH STABILITY: MENTAL HEALTH: HOW OFTEN DO YOU HAVE A DRINK CONTAINING ALCOHOL?: NEVER

## 2020-06-18 ASSESSMENT — PAIN SCALES - GENERAL
PAINLEVEL_OUTOF10: 0

## 2020-06-18 ASSESSMENT — ENCOUNTER SYMPTOMS
WEAKNESS: 1
CONFUSION: 1

## 2020-06-19 LAB
ANION GAP SERPL CALC-SCNC: 5 MMOL/L (ref 10–20)
BUN SERPL-MCNC: 18 MG/DL (ref 6–20)
BUN/CREAT SERPL: 18 (ref 7–25)
CALCIUM SERPL-MCNC: 9 MG/DL (ref 8.4–10.2)
CHLORIDE SERPL-SCNC: 110 MMOL/L (ref 98–107)
CO2 SERPL-SCNC: 31 MMOL/L (ref 21–32)
CREAT SERPL-MCNC: 0.99 MG/DL (ref 0.51–0.95)
ERYTHROCYTE [DISTWIDTH] IN BLOOD: 13.2 % (ref 11–15)
GLUCOSE SERPL-MCNC: 106 MG/DL (ref 65–99)
HCT VFR BLD CALC: 40 % (ref 36–46.5)
HGB BLD-MCNC: 13.2 G/DL (ref 12–15.5)
MCH RBC QN AUTO: 29.6 PG (ref 26–34)
MCHC RBC AUTO-ENTMCNC: 33 G/DL (ref 32–36.5)
MCV RBC AUTO: 89.7 FL (ref 78–100)
NRBC BLD MANUAL-RTO: 0 /100 WBC
PLATELET # BLD: 197 K/MCL (ref 140–450)
POTASSIUM SERPL-SCNC: 4.4 MMOL/L (ref 3.4–5.1)
RBC # BLD: 4.46 MIL/MCL (ref 4–5.2)
SODIUM SERPL-SCNC: 142 MMOL/L (ref 135–145)
WBC # BLD: 6.4 K/MCL (ref 4.2–11)

## 2020-06-19 PROCEDURE — 10002801 HB RX 250 W/O HCPCS: Performed by: SURGERY

## 2020-06-19 PROCEDURE — 10000008 HB ROOM CHARGE ICU OR CCU

## 2020-06-19 PROCEDURE — 10002803 HB RX 637: Performed by: SURGERY

## 2020-06-19 PROCEDURE — 10002800 HB RX 250 W HCPCS: Performed by: SURGERY

## 2020-06-19 PROCEDURE — 80048 BASIC METABOLIC PNL TOTAL CA: CPT

## 2020-06-19 PROCEDURE — 99232 SBSQ HOSP IP/OBS MODERATE 35: CPT | Performed by: NURSE PRACTITIONER

## 2020-06-19 PROCEDURE — 97116 GAIT TRAINING THERAPY: CPT

## 2020-06-19 PROCEDURE — 99291 CRITICAL CARE FIRST HOUR: CPT

## 2020-06-19 PROCEDURE — 99233 SBSQ HOSP IP/OBS HIGH 50: CPT | Performed by: FAMILY MEDICINE

## 2020-06-19 PROCEDURE — 85027 COMPLETE CBC AUTOMATED: CPT

## 2020-06-19 PROCEDURE — 36415 COLL VENOUS BLD VENIPUNCTURE: CPT

## 2020-06-19 PROCEDURE — 97166 OT EVAL MOD COMPLEX 45 MIN: CPT

## 2020-06-19 PROCEDURE — 97535 SELF CARE MNGMENT TRAINING: CPT

## 2020-06-19 PROCEDURE — 10004651 HB RX, NO CHARGE ITEM: Performed by: NURSE PRACTITIONER

## 2020-06-19 PROCEDURE — 97162 PT EVAL MOD COMPLEX 30 MIN: CPT

## 2020-06-19 RX ADMIN — ENOXAPARIN SODIUM 30 MG: 30 INJECTION SUBCUTANEOUS at 05:44

## 2020-06-19 RX ADMIN — VENLAFAXINE HYDROCHLORIDE 150 MG: 75 CAPSULE, EXTENDED RELEASE ORAL at 08:51

## 2020-06-19 RX ADMIN — LEVOTHYROXINE SODIUM 50 MCG: 50 TABLET ORAL at 08:51

## 2020-06-19 RX ADMIN — SODIUM CHLORIDE, PRESERVATIVE FREE 2 ML: 5 INJECTION INTRAVENOUS at 13:15

## 2020-06-19 RX ADMIN — SODIUM CHLORIDE, PRESERVATIVE FREE 2 ML: 5 INJECTION INTRAVENOUS at 20:46

## 2020-06-19 RX ADMIN — Medication 1 TABLET: at 08:51

## 2020-06-19 RX ADMIN — PRAVASTATIN SODIUM 20 MG: 20 TABLET ORAL at 20:46

## 2020-06-19 ASSESSMENT — COGNITIVE AND FUNCTIONAL STATUS - GENERAL
HELP NEEDED DRESSING REGULAR UPPER BODY CLOTHING: A LITTLE
HELP NEEDED FOR PERSONAL GROOMING: A LITTLE
BASIC_MOBILITY_CONVERTED_SCORE: 42.48
DAILY_ACTIVITY_CONVERTED_SCORE: 37.26
BASIC_MOBILITY_RAW_SCORE: 19
HELP NEEDED FOR BATHING: A LOT
DAILY_ACTIVITY_RAW_SCORE: 17
HELP NEEDED FOR TOILETING: A LITTLE
HELP NEEDED DRESSING REGULAR LOWER BODY CLOTHING: A LITTLE

## 2020-06-19 ASSESSMENT — PAIN SCALES - GENERAL
PAINLEVEL_OUTOF10: 0
PAINLEVEL_OUTOF10: 0

## 2020-06-19 ASSESSMENT — ACTIVITIES OF DAILY LIVING (ADL)
HOME_MANAGEMENT_TIME_ENTRY: 15
PRIOR_ADL: INDEPENDENT

## 2020-06-20 VITALS
BODY MASS INDEX: 24.79 KG/M2 | TEMPERATURE: 97.3 F | WEIGHT: 134.7 LBS | SYSTOLIC BLOOD PRESSURE: 127 MMHG | OXYGEN SATURATION: 94 % | DIASTOLIC BLOOD PRESSURE: 79 MMHG | HEIGHT: 62 IN | RESPIRATION RATE: 16 BRPM | HEART RATE: 69 BPM

## 2020-06-20 PROCEDURE — 10002800 HB RX 250 W HCPCS: Performed by: SURGERY

## 2020-06-20 PROCEDURE — 99233 SBSQ HOSP IP/OBS HIGH 50: CPT | Performed by: PHYSICIAN ASSISTANT

## 2020-06-20 PROCEDURE — 99239 HOSP IP/OBS DSCHRG MGMT >30: CPT | Performed by: FAMILY MEDICINE

## 2020-06-20 PROCEDURE — 99291 CRITICAL CARE FIRST HOUR: CPT | Performed by: SURGERY

## 2020-06-20 PROCEDURE — 10004281 HB COUNTER-STAFF TIME PER 15 MIN

## 2020-06-20 PROCEDURE — 10002801 HB RX 250 W/O HCPCS: Performed by: SURGERY

## 2020-06-20 PROCEDURE — 10002803 HB RX 637: Performed by: SURGERY

## 2020-06-20 RX ADMIN — DOCUSATE SODIUM 100 MG: 100 CAPSULE, LIQUID FILLED ORAL at 08:50

## 2020-06-20 RX ADMIN — ENOXAPARIN SODIUM 30 MG: 30 INJECTION SUBCUTANEOUS at 06:15

## 2020-06-20 RX ADMIN — PRAVASTATIN SODIUM 20 MG: 20 TABLET ORAL at 20:04

## 2020-06-20 RX ADMIN — VENLAFAXINE HYDROCHLORIDE 150 MG: 75 CAPSULE, EXTENDED RELEASE ORAL at 08:51

## 2020-06-20 RX ADMIN — LEVOTHYROXINE SODIUM 50 MCG: 50 TABLET ORAL at 08:50

## 2020-06-20 RX ADMIN — Medication 1 TABLET: at 08:51

## 2020-06-20 ASSESSMENT — PAIN SCALES - GENERAL: PAINLEVEL_OUTOF10: 0

## 2020-06-22 ENCOUNTER — TELEPHONE (OUTPATIENT)
Dept: INTERNAL MEDICINE | Age: 80
End: 2020-06-22

## 2020-06-23 RX ORDER — METHYLPHENIDATE HYDROCHLORIDE 10 MG/1
TABLET ORAL
Qty: 30 TABLET | Refills: 0 | Status: SHIPPED | OUTPATIENT
Start: 2020-06-24 | End: 2020-07-27 | Stop reason: ALTCHOICE

## 2020-06-24 ENCOUNTER — APPOINTMENT (OUTPATIENT)
Dept: GENERAL RADIOLOGY | Facility: HOSPITAL | Age: 80
DRG: 470 | End: 2020-06-24
Attending: EMERGENCY MEDICINE
Payer: MEDICARE

## 2020-06-24 ENCOUNTER — HOSPITAL ENCOUNTER (INPATIENT)
Facility: HOSPITAL | Age: 80
LOS: 4 days | Discharge: NURSING, REHAB OR LONG-TERM CARE | DRG: 470 | End: 2020-06-29
Attending: EMERGENCY MEDICINE | Admitting: HOSPITALIST
Payer: MEDICARE

## 2020-06-24 DIAGNOSIS — S72.009A HIP FRACTURE (HCC): ICD-10-CM

## 2020-06-24 DIAGNOSIS — S72.001A CLOSED FRACTURE OF NECK OF RIGHT FEMUR, INITIAL ENCOUNTER (HCC): Primary | ICD-10-CM

## 2020-06-24 LAB
ALBUMIN SERPL-MCNC: 3.7 G/DL (ref 3.4–5)
ALBUMIN/GLOB SERPL: 0.8 {RATIO} (ref 1–2)
ALP LIVER SERPL-CCNC: 55 U/L (ref 55–142)
ALT SERPL-CCNC: 22 U/L (ref 13–56)
ANION GAP SERPL CALC-SCNC: 5 MMOL/L (ref 0–18)
ANTIBODY SCREEN: NEGATIVE
AST SERPL-CCNC: 21 U/L (ref 15–37)
BASOPHILS # BLD AUTO: 0.02 X10(3) UL (ref 0–0.2)
BASOPHILS NFR BLD AUTO: 0.2 %
BILIRUB SERPL-MCNC: 0.2 MG/DL (ref 0.1–2)
BUN BLD-MCNC: 33 MG/DL (ref 7–18)
BUN/CREAT SERPL: 21.6 (ref 10–20)
CALCIUM BLD-MCNC: 9.1 MG/DL (ref 8.5–10.1)
CHLORIDE SERPL-SCNC: 101 MMOL/L (ref 98–112)
CO2 SERPL-SCNC: 31 MMOL/L (ref 21–32)
CREAT BLD-MCNC: 1.53 MG/DL (ref 0.55–1.02)
DEPRECATED RDW RBC AUTO: 43.2 FL (ref 35.1–46.3)
EOSINOPHIL # BLD AUTO: 0 X10(3) UL (ref 0–0.7)
EOSINOPHIL NFR BLD AUTO: 0 %
ERYTHROCYTE [DISTWIDTH] IN BLOOD BY AUTOMATED COUNT: 13.1 % (ref 11–15)
GLOBULIN PLAS-MCNC: 4.5 G/DL (ref 2.8–4.4)
GLUCOSE BLD-MCNC: 107 MG/DL (ref 70–99)
HCT VFR BLD AUTO: 40.2 % (ref 35–48)
HGB BLD-MCNC: 13.2 G/DL (ref 12–16)
IMM GRANULOCYTES # BLD AUTO: 0.06 X10(3) UL (ref 0–1)
IMM GRANULOCYTES NFR BLD: 0.6 %
INR BLD: 1.01 (ref 0.89–1.11)
LYMPHOCYTES # BLD AUTO: 1.41 X10(3) UL (ref 1–4)
LYMPHOCYTES NFR BLD AUTO: 14.4 %
M PROTEIN MFR SERPL ELPH: 8.2 G/DL (ref 6.4–8.2)
MCH RBC QN AUTO: 29.6 PG (ref 26–34)
MCHC RBC AUTO-ENTMCNC: 32.8 G/DL (ref 31–37)
MCV RBC AUTO: 90.1 FL (ref 80–100)
MONOCYTES # BLD AUTO: 0.77 X10(3) UL (ref 0.1–1)
MONOCYTES NFR BLD AUTO: 7.9 %
NEUTROPHILS # BLD AUTO: 7.51 X10 (3) UL (ref 1.5–7.7)
NEUTROPHILS # BLD AUTO: 7.51 X10(3) UL (ref 1.5–7.7)
NEUTROPHILS NFR BLD AUTO: 76.9 %
OSMOLALITY SERPL CALC.SUM OF ELEC: 292 MOSM/KG (ref 275–295)
PLATELET # BLD AUTO: 285 10(3)UL (ref 150–450)
POTASSIUM SERPL-SCNC: 4.1 MMOL/L (ref 3.5–5.1)
PSA SERPL DL<=0.01 NG/ML-MCNC: 13.6 SECONDS (ref 12.4–14.6)
RBC # BLD AUTO: 4.46 X10(6)UL (ref 3.8–5.3)
RH BLOOD TYPE: POSITIVE
SARS-COV-2 RNA RESP QL NAA+PROBE: NOT DETECTED
SODIUM SERPL-SCNC: 137 MMOL/L (ref 136–145)
WBC # BLD AUTO: 9.8 X10(3) UL (ref 4–11)

## 2020-06-24 PROCEDURE — 99223 1ST HOSP IP/OBS HIGH 75: CPT | Performed by: HOSPITALIST

## 2020-06-24 PROCEDURE — 71045 X-RAY EXAM CHEST 1 VIEW: CPT | Performed by: EMERGENCY MEDICINE

## 2020-06-24 PROCEDURE — 73502 X-RAY EXAM HIP UNI 2-3 VIEWS: CPT | Performed by: EMERGENCY MEDICINE

## 2020-06-25 ENCOUNTER — APPOINTMENT (OUTPATIENT)
Dept: GENERAL RADIOLOGY | Facility: HOSPITAL | Age: 80
DRG: 470 | End: 2020-06-25
Attending: NURSE PRACTITIONER
Payer: MEDICARE

## 2020-06-25 ENCOUNTER — ANESTHESIA (OUTPATIENT)
Dept: SURGERY | Facility: HOSPITAL | Age: 80
DRG: 470 | End: 2020-06-25
Payer: MEDICARE

## 2020-06-25 ENCOUNTER — ANESTHESIA EVENT (OUTPATIENT)
Dept: SURGERY | Facility: HOSPITAL | Age: 80
DRG: 470 | End: 2020-06-25
Payer: MEDICARE

## 2020-06-25 ENCOUNTER — TELEPHONE (OUTPATIENT)
Dept: INTERNAL MEDICINE | Age: 80
End: 2020-06-25

## 2020-06-25 ENCOUNTER — EXTERNAL RECORD (OUTPATIENT)
Dept: HEALTH INFORMATION MANAGEMENT | Facility: OTHER | Age: 80
End: 2020-06-25

## 2020-06-25 PROBLEM — S72.001A CLOSED FRACTURE OF NECK OF RIGHT FEMUR, INITIAL ENCOUNTER (HCC): Status: ACTIVE | Noted: 2020-06-25

## 2020-06-25 PROBLEM — S72.001A HIP FRACTURE REQUIRING OPERATIVE REPAIR, RIGHT, CLOSED, INITIAL ENCOUNTER (HCC): Status: ACTIVE | Noted: 2020-06-25

## 2020-06-25 LAB
ANION GAP SERPL CALC-SCNC: 4 MMOL/L (ref 0–18)
ATRIAL RATE: 90 BPM
BILIRUB UR QL STRIP.AUTO: NEGATIVE
BUN BLD-MCNC: 30 MG/DL (ref 7–18)
BUN/CREAT SERPL: 25.9 (ref 10–20)
CALCIUM BLD-MCNC: 8.5 MG/DL (ref 8.5–10.1)
CHLORIDE SERPL-SCNC: 106 MMOL/L (ref 98–112)
CO2 SERPL-SCNC: 27 MMOL/L (ref 21–32)
COLOR UR AUTO: YELLOW
CREAT BLD-MCNC: 1.16 MG/DL (ref 0.55–1.02)
GLUCOSE BLD-MCNC: 115 MG/DL (ref 70–99)
GLUCOSE UR STRIP.AUTO-MCNC: NEGATIVE MG/DL
KETONES UR STRIP.AUTO-MCNC: NEGATIVE MG/DL
LEUKOCYTE ESTERASE UR QL STRIP.AUTO: NEGATIVE
MRSA NASAL: NEGATIVE
NITRITE UR QL STRIP.AUTO: NEGATIVE
OSMOLALITY SERPL CALC.SUM OF ELEC: 291 MOSM/KG (ref 275–295)
P AXIS: 34 DEGREES
P-R INTERVAL: 124 MS
PH UR STRIP.AUTO: 6 [PH] (ref 4.5–8)
POTASSIUM SERPL-SCNC: 4.4 MMOL/L (ref 3.5–5.1)
PROT UR STRIP.AUTO-MCNC: NEGATIVE MG/DL
Q-T INTERVAL: 408 MS
QRS DURATION: 88 MS
QTC CALCULATION (BEZET): 499 MS
R AXIS: -20 DEGREES
RBC UR QL AUTO: NEGATIVE
SODIUM SERPL-SCNC: 137 MMOL/L (ref 136–145)
SP GR UR STRIP.AUTO: 1.01 (ref 1–1.03)
STAPH A BY PCR: NEGATIVE
T AXIS: 43 DEGREES
UROBILINOGEN UR STRIP.AUTO-MCNC: <2 MG/DL
VENTRICULAR RATE: 90 BPM
VIT D+METAB SERPL-MCNC: 29.2 NG/ML (ref 30–100)

## 2020-06-25 PROCEDURE — 99221 1ST HOSP IP/OBS SF/LOW 40: CPT | Performed by: ORTHOPAEDIC SURGERY

## 2020-06-25 PROCEDURE — 27236 TREAT THIGH FRACTURE: CPT | Performed by: ORTHOPAEDIC SURGERY

## 2020-06-25 PROCEDURE — 27236 TREAT THIGH FRACTURE: CPT | Performed by: NURSE PRACTITIONER

## 2020-06-25 PROCEDURE — 73501 X-RAY EXAM HIP UNI 1 VIEW: CPT | Performed by: NURSE PRACTITIONER

## 2020-06-25 PROCEDURE — 0SRR0J9 REPLACEMENT OF RIGHT HIP JOINT, FEMORAL SURFACE WITH SYNTHETIC SUBSTITUTE, CEMENTED, OPEN APPROACH: ICD-10-PCS | Performed by: ORTHOPAEDIC SURGERY

## 2020-06-25 PROCEDURE — 99232 SBSQ HOSP IP/OBS MODERATE 35: CPT | Performed by: HOSPITALIST

## 2020-06-25 PROCEDURE — 3E0T3BZ INTRODUCTION OF ANESTHETIC AGENT INTO PERIPHERAL NERVES AND PLEXI, PERCUTANEOUS APPROACH: ICD-10-PCS | Performed by: ANESTHESIOLOGY

## 2020-06-25 DEVICE — IMPLANTABLE DEVICE: Type: IMPLANTABLE DEVICE | Site: HIP | Status: FUNCTIONAL

## 2020-06-25 DEVICE — CEMENT BONE RADIOPAQ HOWMEDICA: Type: IMPLANTABLE DEVICE | Site: HIP | Status: FUNCTIONAL

## 2020-06-25 DEVICE — BIPOLAR LINER 42/43 ODX22MM: Type: IMPLANTABLE DEVICE | Site: HIP | Status: FUNCTIONAL

## 2020-06-25 DEVICE — VERSYS DISTAL CENTRALIZER 11MM: Type: IMPLANTABLE DEVICE | Site: HIP | Status: FUNCTIONAL

## 2020-06-25 RX ORDER — METOCLOPRAMIDE HYDROCHLORIDE 5 MG/ML
5 INJECTION INTRAMUSCULAR; INTRAVENOUS EVERY 8 HOURS PRN
Status: DISCONTINUED | OUTPATIENT
Start: 2020-06-25 | End: 2020-06-25

## 2020-06-25 RX ORDER — HYDROCODONE BITARTRATE AND ACETAMINOPHEN 10; 325 MG/1; MG/1
1 TABLET ORAL AS NEEDED
Status: DISCONTINUED | OUTPATIENT
Start: 2020-06-25 | End: 2020-06-25 | Stop reason: HOSPADM

## 2020-06-25 RX ORDER — BISACODYL 10 MG
10 SUPPOSITORY, RECTAL RECTAL
Status: DISCONTINUED | OUTPATIENT
Start: 2020-06-25 | End: 2020-06-29

## 2020-06-25 RX ORDER — TRANEXAMIC ACID 10 MG/ML
1000 INJECTION, SOLUTION INTRAVENOUS ONCE
Status: COMPLETED | OUTPATIENT
Start: 2020-06-25 | End: 2020-06-25

## 2020-06-25 RX ORDER — MORPHINE SULFATE 4 MG/ML
1 INJECTION, SOLUTION INTRAMUSCULAR; INTRAVENOUS EVERY 2 HOUR PRN
Status: DISCONTINUED | OUTPATIENT
Start: 2020-06-25 | End: 2020-06-26

## 2020-06-25 RX ORDER — POLYETHYLENE GLYCOL 3350 17 G/17G
17 POWDER, FOR SOLUTION ORAL DAILY PRN
Status: DISCONTINUED | OUTPATIENT
Start: 2020-06-25 | End: 2020-06-29

## 2020-06-25 RX ORDER — SODIUM CHLORIDE, SODIUM LACTATE, POTASSIUM CHLORIDE, CALCIUM CHLORIDE 600; 310; 30; 20 MG/100ML; MG/100ML; MG/100ML; MG/100ML
INJECTION, SOLUTION INTRAVENOUS CONTINUOUS
Status: DISCONTINUED | OUTPATIENT
Start: 2020-06-25 | End: 2020-06-29

## 2020-06-25 RX ORDER — NALOXONE HYDROCHLORIDE 0.4 MG/ML
80 INJECTION, SOLUTION INTRAMUSCULAR; INTRAVENOUS; SUBCUTANEOUS AS NEEDED
Status: DISCONTINUED | OUTPATIENT
Start: 2020-06-25 | End: 2020-06-25 | Stop reason: HOSPADM

## 2020-06-25 RX ORDER — DEXAMETHASONE SODIUM PHOSPHATE 4 MG/ML
VIAL (ML) INJECTION AS NEEDED
Status: DISCONTINUED | OUTPATIENT
Start: 2020-06-25 | End: 2020-06-25 | Stop reason: SURG

## 2020-06-25 RX ORDER — DIPHENHYDRAMINE HCL 25 MG
25 CAPSULE ORAL EVERY 4 HOURS PRN
Status: DISCONTINUED | OUTPATIENT
Start: 2020-06-25 | End: 2020-06-29

## 2020-06-25 RX ORDER — SODIUM CHLORIDE, SODIUM LACTATE, POTASSIUM CHLORIDE, CALCIUM CHLORIDE 600; 310; 30; 20 MG/100ML; MG/100ML; MG/100ML; MG/100ML
INJECTION, SOLUTION INTRAVENOUS CONTINUOUS
Status: DISCONTINUED | OUTPATIENT
Start: 2020-06-25 | End: 2020-06-25 | Stop reason: HOSPADM

## 2020-06-25 RX ORDER — HYDRALAZINE HYDROCHLORIDE 20 MG/ML
INJECTION INTRAMUSCULAR; INTRAVENOUS AS NEEDED
Status: DISCONTINUED | OUTPATIENT
Start: 2020-06-25 | End: 2020-06-25 | Stop reason: SURG

## 2020-06-25 RX ORDER — DIAPER,BRIEF,INFANT-TODD,DISP
EACH MISCELLANEOUS 2 TIMES DAILY
Status: DISCONTINUED | OUTPATIENT
Start: 2020-06-25 | End: 2020-06-29

## 2020-06-25 RX ORDER — PROCHLORPERAZINE EDISYLATE 5 MG/ML
10 INJECTION INTRAMUSCULAR; INTRAVENOUS EVERY 6 HOURS PRN
Status: DISCONTINUED | OUTPATIENT
Start: 2020-06-25 | End: 2020-06-26

## 2020-06-25 RX ORDER — HYDROMORPHONE HYDROCHLORIDE 1 MG/ML
0.4 INJECTION, SOLUTION INTRAMUSCULAR; INTRAVENOUS; SUBCUTANEOUS EVERY 5 MIN PRN
Status: DISCONTINUED | OUTPATIENT
Start: 2020-06-25 | End: 2020-06-25 | Stop reason: HOSPADM

## 2020-06-25 RX ORDER — MEPERIDINE HYDROCHLORIDE 25 MG/ML
12.5 INJECTION INTRAMUSCULAR; INTRAVENOUS; SUBCUTANEOUS AS NEEDED
Status: DISCONTINUED | OUTPATIENT
Start: 2020-06-25 | End: 2020-06-25 | Stop reason: HOSPADM

## 2020-06-25 RX ORDER — SODIUM PHOSPHATE, DIBASIC AND SODIUM PHOSPHATE, MONOBASIC 7; 19 G/133ML; G/133ML
1 ENEMA RECTAL ONCE AS NEEDED
Status: DISCONTINUED | OUTPATIENT
Start: 2020-06-25 | End: 2020-06-29

## 2020-06-25 RX ORDER — PHENYLEPHRINE HCL 10 MG/ML
VIAL (ML) INJECTION AS NEEDED
Status: DISCONTINUED | OUTPATIENT
Start: 2020-06-25 | End: 2020-06-25 | Stop reason: SURG

## 2020-06-25 RX ORDER — DIPHENHYDRAMINE HYDROCHLORIDE 50 MG/ML
12.5 INJECTION INTRAMUSCULAR; INTRAVENOUS EVERY 4 HOURS PRN
Status: DISCONTINUED | OUTPATIENT
Start: 2020-06-25 | End: 2020-06-29

## 2020-06-25 RX ORDER — HYDROCODONE BITARTRATE AND ACETAMINOPHEN 5; 325 MG/1; MG/1
1 TABLET ORAL EVERY 4 HOURS PRN
Status: DISCONTINUED | OUTPATIENT
Start: 2020-06-25 | End: 2020-06-26

## 2020-06-25 RX ORDER — ONDANSETRON 2 MG/ML
4 INJECTION INTRAMUSCULAR; INTRAVENOUS EVERY 4 HOURS PRN
Status: ACTIVE | OUTPATIENT
Start: 2020-06-25 | End: 2020-06-27

## 2020-06-25 RX ORDER — DOCUSATE SODIUM 100 MG/1
100 CAPSULE, LIQUID FILLED ORAL 2 TIMES DAILY
Status: DISCONTINUED | OUTPATIENT
Start: 2020-06-25 | End: 2020-06-29

## 2020-06-25 RX ORDER — ATORVASTATIN CALCIUM 10 MG/1
10 TABLET, FILM COATED ORAL NIGHTLY
Status: DISCONTINUED | OUTPATIENT
Start: 2020-06-25 | End: 2020-06-29

## 2020-06-25 RX ORDER — CEFAZOLIN SODIUM 1 G/3ML
INJECTION, POWDER, FOR SOLUTION INTRAMUSCULAR; INTRAVENOUS AS NEEDED
Status: DISCONTINUED | OUTPATIENT
Start: 2020-06-25 | End: 2020-06-25 | Stop reason: SURG

## 2020-06-25 RX ORDER — SENNOSIDES 8.6 MG
17.2 TABLET ORAL NIGHTLY
Status: DISCONTINUED | OUTPATIENT
Start: 2020-06-25 | End: 2020-06-29

## 2020-06-25 RX ORDER — ASPIRIN 325 MG
325 TABLET ORAL 2 TIMES DAILY
Status: DISCONTINUED | OUTPATIENT
Start: 2020-06-25 | End: 2020-06-26

## 2020-06-25 RX ORDER — HYDROCODONE BITARTRATE AND ACETAMINOPHEN 10; 325 MG/1; MG/1
2 TABLET ORAL AS NEEDED
Status: DISCONTINUED | OUTPATIENT
Start: 2020-06-25 | End: 2020-06-25 | Stop reason: HOSPADM

## 2020-06-25 RX ORDER — VENLAFAXINE HYDROCHLORIDE 75 MG/1
225 CAPSULE, EXTENDED RELEASE ORAL DAILY
Status: DISCONTINUED | OUTPATIENT
Start: 2020-06-25 | End: 2020-06-25

## 2020-06-25 RX ORDER — ONDANSETRON 2 MG/ML
4 INJECTION INTRAMUSCULAR; INTRAVENOUS AS NEEDED
Status: DISCONTINUED | OUTPATIENT
Start: 2020-06-25 | End: 2020-06-25 | Stop reason: HOSPADM

## 2020-06-25 RX ORDER — DONEPEZIL HYDROCHLORIDE 10 MG/1
10 TABLET, FILM COATED ORAL NIGHTLY
Status: DISCONTINUED | OUTPATIENT
Start: 2020-06-25 | End: 2020-06-29

## 2020-06-25 RX ORDER — MORPHINE SULFATE 4 MG/ML
4 INJECTION, SOLUTION INTRAMUSCULAR; INTRAVENOUS ONCE
Status: COMPLETED | OUTPATIENT
Start: 2020-06-25 | End: 2020-06-25

## 2020-06-25 RX ORDER — CEFAZOLIN SODIUM/WATER 2 G/20 ML
2 SYRINGE (ML) INTRAVENOUS EVERY 8 HOURS
Status: COMPLETED | OUTPATIENT
Start: 2020-06-26 | End: 2020-06-26

## 2020-06-25 RX ORDER — ROCURONIUM BROMIDE 10 MG/ML
INJECTION, SOLUTION INTRAVENOUS AS NEEDED
Status: DISCONTINUED | OUTPATIENT
Start: 2020-06-25 | End: 2020-06-25 | Stop reason: SURG

## 2020-06-25 RX ORDER — ACETAMINOPHEN 325 MG/1
650 TABLET ORAL EVERY 4 HOURS PRN
Status: DISCONTINUED | OUTPATIENT
Start: 2020-06-25 | End: 2020-06-26

## 2020-06-25 RX ORDER — BISACODYL 10 MG
10 SUPPOSITORY, RECTAL RECTAL
Status: DISCONTINUED | OUTPATIENT
Start: 2020-06-25 | End: 2020-06-25

## 2020-06-25 RX ORDER — VENLAFAXINE HYDROCHLORIDE 75 MG/1
150 CAPSULE, EXTENDED RELEASE ORAL DAILY
Status: DISCONTINUED | OUTPATIENT
Start: 2020-06-26 | End: 2020-06-29

## 2020-06-25 RX ORDER — DIPHENHYDRAMINE HYDROCHLORIDE 50 MG/ML
25 INJECTION INTRAMUSCULAR; INTRAVENOUS ONCE AS NEEDED
Status: ACTIVE | OUTPATIENT
Start: 2020-06-25 | End: 2020-06-25

## 2020-06-25 RX ORDER — LEVOTHYROXINE SODIUM 0.05 MG/1
50 TABLET ORAL
Status: DISCONTINUED | OUTPATIENT
Start: 2020-06-25 | End: 2020-06-29

## 2020-06-25 RX ORDER — MORPHINE SULFATE 4 MG/ML
4 INJECTION, SOLUTION INTRAMUSCULAR; INTRAVENOUS EVERY 2 HOUR PRN
Status: DISCONTINUED | OUTPATIENT
Start: 2020-06-25 | End: 2020-06-26

## 2020-06-25 RX ORDER — LIDOCAINE HYDROCHLORIDE 10 MG/ML
INJECTION, SOLUTION EPIDURAL; INFILTRATION; INTRACAUDAL; PERINEURAL AS NEEDED
Status: DISCONTINUED | OUTPATIENT
Start: 2020-06-25 | End: 2020-06-25 | Stop reason: SURG

## 2020-06-25 RX ORDER — SODIUM CHLORIDE 9 MG/ML
INJECTION, SOLUTION INTRAVENOUS CONTINUOUS
Status: DISCONTINUED | OUTPATIENT
Start: 2020-06-25 | End: 2020-06-29

## 2020-06-25 RX ORDER — POLYETHYLENE GLYCOL 3350 17 G/17G
17 POWDER, FOR SOLUTION ORAL DAILY PRN
Status: DISCONTINUED | OUTPATIENT
Start: 2020-06-25 | End: 2020-06-25

## 2020-06-25 RX ORDER — NEOSTIGMINE METHYLSULFATE 1 MG/ML
INJECTION INTRAVENOUS AS NEEDED
Status: DISCONTINUED | OUTPATIENT
Start: 2020-06-25 | End: 2020-06-25 | Stop reason: SURG

## 2020-06-25 RX ORDER — HYDROCODONE BITARTRATE AND ACETAMINOPHEN 5; 325 MG/1; MG/1
2 TABLET ORAL EVERY 4 HOURS PRN
Status: DISCONTINUED | OUTPATIENT
Start: 2020-06-25 | End: 2020-06-26

## 2020-06-25 RX ORDER — ONDANSETRON 2 MG/ML
4 INJECTION INTRAMUSCULAR; INTRAVENOUS EVERY 6 HOURS PRN
Status: DISCONTINUED | OUTPATIENT
Start: 2020-06-25 | End: 2020-06-26

## 2020-06-25 RX ORDER — METOCLOPRAMIDE HYDROCHLORIDE 5 MG/ML
10 INJECTION INTRAMUSCULAR; INTRAVENOUS AS NEEDED
Status: DISCONTINUED | OUTPATIENT
Start: 2020-06-25 | End: 2020-06-25 | Stop reason: HOSPADM

## 2020-06-25 RX ORDER — MORPHINE SULFATE 4 MG/ML
2 INJECTION, SOLUTION INTRAMUSCULAR; INTRAVENOUS EVERY 2 HOUR PRN
Status: DISCONTINUED | OUTPATIENT
Start: 2020-06-25 | End: 2020-06-26

## 2020-06-25 RX ORDER — METOCLOPRAMIDE HYDROCHLORIDE 5 MG/ML
5 INJECTION INTRAMUSCULAR; INTRAVENOUS EVERY 6 HOURS PRN
Status: ACTIVE | OUTPATIENT
Start: 2020-06-25 | End: 2020-06-27

## 2020-06-25 RX ORDER — GLYCOPYRROLATE 0.2 MG/ML
INJECTION, SOLUTION INTRAMUSCULAR; INTRAVENOUS AS NEEDED
Status: DISCONTINUED | OUTPATIENT
Start: 2020-06-25 | End: 2020-06-25 | Stop reason: SURG

## 2020-06-25 RX ORDER — DOCUSATE SODIUM 100 MG/1
100 CAPSULE, LIQUID FILLED ORAL 2 TIMES DAILY
Status: DISCONTINUED | OUTPATIENT
Start: 2020-06-25 | End: 2020-06-25

## 2020-06-25 RX ADMIN — SODIUM CHLORIDE: 9 INJECTION, SOLUTION INTRAVENOUS at 18:00:00

## 2020-06-25 RX ADMIN — PHENYLEPHRINE HCL 100 MCG: 10 MG/ML VIAL (ML) INJECTION at 18:58:00

## 2020-06-25 RX ADMIN — HYDRALAZINE HYDROCHLORIDE 10 MG: 20 INJECTION INTRAMUSCULAR; INTRAVENOUS at 17:59:00

## 2020-06-25 RX ADMIN — PHENYLEPHRINE HCL 200 MCG: 10 MG/ML VIAL (ML) INJECTION at 18:17:00

## 2020-06-25 RX ADMIN — ROCURONIUM BROMIDE 40 MG: 10 INJECTION, SOLUTION INTRAVENOUS at 17:02:00

## 2020-06-25 RX ADMIN — PHENYLEPHRINE HCL 100 MCG: 10 MG/ML VIAL (ML) INJECTION at 18:05:00

## 2020-06-25 RX ADMIN — PHENYLEPHRINE HCL 100 MCG: 10 MG/ML VIAL (ML) INJECTION at 18:55:00

## 2020-06-25 RX ADMIN — ONDANSETRON 4 MG: 2 INJECTION INTRAMUSCULAR; INTRAVENOUS at 19:18:00

## 2020-06-25 RX ADMIN — LIDOCAINE HYDROCHLORIDE 50 MG: 10 INJECTION, SOLUTION EPIDURAL; INFILTRATION; INTRACAUDAL; PERINEURAL at 17:02:00

## 2020-06-25 RX ADMIN — DEXAMETHASONE SODIUM PHOSPHATE 4 MG: 4 MG/ML VIAL (ML) INJECTION at 19:18:00

## 2020-06-25 RX ADMIN — CEFAZOLIN SODIUM 2 G: 1 INJECTION, POWDER, FOR SOLUTION INTRAMUSCULAR; INTRAVENOUS at 17:20:00

## 2020-06-25 RX ADMIN — PHENYLEPHRINE HCL 100 MCG: 10 MG/ML VIAL (ML) INJECTION at 17:45:00

## 2020-06-25 RX ADMIN — GLYCOPYRROLATE 0.4 MG: 0.2 INJECTION, SOLUTION INTRAMUSCULAR; INTRAVENOUS at 19:39:00

## 2020-06-25 RX ADMIN — PHENYLEPHRINE HCL 100 MCG: 10 MG/ML VIAL (ML) INJECTION at 19:18:00

## 2020-06-25 RX ADMIN — TRANEXAMIC ACID 1000 MG: 10 INJECTION, SOLUTION INTRAVENOUS at 17:12:00

## 2020-06-25 RX ADMIN — PHENYLEPHRINE HCL 100 MCG: 10 MG/ML VIAL (ML) INJECTION at 18:49:00

## 2020-06-25 RX ADMIN — HYDRALAZINE HYDROCHLORIDE 10 MG: 20 INJECTION INTRAMUSCULAR; INTRAVENOUS at 18:02:00

## 2020-06-25 RX ADMIN — NEOSTIGMINE METHYLSULFATE 2 MG: 1 INJECTION INTRAVENOUS at 19:39:00

## 2020-06-25 NOTE — ANESTHESIA PREPROCEDURE EVALUATION
PRE-OP EVALUATION    Patient Name: Claudine Magdaleno    Pre-op Diagnosis: Hip fracture ([address] Francia Abad    Procedure(s):  RIGHT CEMENTED HIP REPLACEMENT    Surgeon(s) and Role:     Jaiden Scott MD - Primary    Pre-op vitals reviewed. Temp: 98.3 °F (36. for Pain., Disp: , Rfl:   Calcium-Vitamin D (CALTRATE 600 PLUS-VIT D OR), Take by mouth., Disp: , Rfl:   Lovastatin 10 MG Oral Tab, Take 30 mg by mouth nightly.  , Disp: , Rfl:   Venlafaxine HCl ER 75 MG Oral Capsule SR 24 Hr, Take 150 mg by mouth daily. regional  NPO status verified and patient meets guidelines.           Plan/risks discussed with: patient and significant other                Present on Admission:  **None**

## 2020-06-25 NOTE — CM/SW NOTE
06/25/20 1100   CM/SW Referral Data   Referral Source Social Work (self-referral)   Reason for Referral Discharge planning   Informant Spouse   Patient Info   Patient's Mental Status Alert;Memory Impairments   Patient's [address]   Number o

## 2020-06-25 NOTE — ED PROVIDER NOTES
Patient Seen in: [facility] Emergency Department      History   Patient presents with:  Fall    Stated Complaint: fall    HPI    Patient here after mechanical fall with right hip pain and inability to ambulate.   She just got up for a walk and trippe joint, pain with any rotation of the joint. Neurological: Pt is alert and oriented to person, place, and time. No cranial nerve deficit. Skin: Skin is warm and dry. Psychiatric: Normal mood and affect.  Thought content normal.       ED Course     L Imaging personally reviewed. X-ray does show a right femoral neck fracture. MDM     Mechanical fall, right hip fracture. Discussed with Dr. Latisha Carney, with Ortho on-call. Will see patient in the morning.   Admission disposition: 6/24/202

## 2020-06-25 NOTE — PLAN OF CARE
Problem: Patient/Family Goals  Goal: Patient/Family Long Term Goal  Description  Patient's Long Term Goal: Go home    Interventions:  - possible surgery  - See additional Care Plan goals for specific interventions   Outcome: Progressing  Goal: Patient/Fa oriented x4, forgetful, delayed responses. VSS. Maintaining o2 sats on r/a. Declined pain mediation as of now, only hurts when moved as pt states. Pt on bedrest, fall precautions in place. Bruising to rt knee noted w/ abrasion to rt forearm.   Surgical

## 2020-06-25 NOTE — H&P
[facility]  History and Physical     Elan العلي Patient Status:  Emergency    1940 MRN [id number]   Location [address] Attending Darell Hart MD   Hosp Day # 0 PCP Carlos Smilye     Chief Complaint: fall, R h docusate sodium 100 MG Oral Cap, Take 100 mg by mouth 2 (two) times daily. , Disp: , Rfl:   acetaminophen 325 MG Oral Tab, Take 650 mg by mouth every 6 (six) hours as needed for Pain., Disp: , Rfl:   ARIPiprazole 2 MG Oral Tab, Take 1 tablet (2 mg total) Estimated Creatinine Clearance: 23.2 mL/min (A) (based on SCr of 1.53 mg/dL (H)). Recent Labs   Lab 06/24/20  2206   PTP 13.6   INR 1.01       No results for input(s): TROP, CK in the last 168 hours. Imaging: Imaging data reviewed in Epic.

## 2020-06-25 NOTE — ED INITIAL ASSESSMENT (HPI)
Patient presents to ED via ambulance with complaints of right hip pain after slip and fall.  Denies any head/neck injury or loc

## 2020-06-25 NOTE — PROGRESS NOTES
[facility] H[facility]PITAL[facility]  Progress Note     Dominique Stanley Patient Status:  Inpatient    1940 MRN [id number]   [facility] 3SW-A Attending Sreedhar Coleman MD   Hosp Day # 0 PCP Yanet Lincoln     Chief Complaint: Hip fracture    S: Patient with • [START ON 6/26/2020] Venlafaxine HCl ER  150 mg Oral Daily       ASSESSMENT / PLAN:     1. Hip fracture: To OR today  2. EMI:  Resolved  3. HTN  4. HL  5. Dementia  6. NPH  7.  Hypothyroidism    Plan of care: As above    Quality:  · DVT Prophylaxis: SC

## 2020-06-25 NOTE — ED NOTES
Report to [facility]. RN pt to transfer to room 351A.  Shayy Duffy provided with orange band, He is aware that he is the only visitor allowed.

## 2020-06-25 NOTE — ED NOTES
concerned about pain control for patient. Discussed with  and patient that we can get prn pain medications to control pain level. Per prior RN pt and family declined femoral block earlier in shift with Dr Nikita Oscar.  Patient continues to decline f

## 2020-06-25 NOTE — OCCUPATIONAL THERAPY NOTE
OT orders received, chart reviewed. Per RN, patient scheduled for surgery for R hip fx later today. Please re-order PT/OT post-op with weight-bearing status when patient appropriate.

## 2020-06-25 NOTE — PLAN OF CARE
Patient is alert and oriented x3, forgetful, has a history of dementia. Denies any pain  at rest and severe when moving. Tylenol given with adequate relieve. VS are stable, on 2l of o2 , IV fluids infusing, NPO.   Patient maintained on bedrest awaiting surg

## 2020-06-25 NOTE — CONSULTS
[facility] Orth[facility] Consult Note    CC: Right hip injury    HPI: This 70year old female admitted from the emergency department overnight for right hip fracture. Patient reportedly was walking on the deck at home, when she tripped and fell.   It was an unwitnessed f Sensation subjectively intact light touch SP DP tibial nerves  • Vascular: Warm well perfused lower extremity      Imaging: AP pelvis as well as right hip x-rays personally reviewed and radiology report read.   Demonstrates fracture of the right hip/femoral hemiarthroplasty. Keep patient n.p.o., hold anticoagulation, plan for OR today if medically optimized and operating room has time available.     Christopher Carter MD  [facility] Orthopedic Surgery

## 2020-06-25 NOTE — ANESTHESIA PROCEDURE NOTES
Airway  Date/Time: 6/25/2020 5:03 PM  Urgency: elective    Airway not difficult    General Information and Staff    Patient location during procedure: OR  Anesthesiologist: Elizabeth Cutler MD    Indications and Patient Condition  Indications for airway

## 2020-06-25 NOTE — PHYSICAL THERAPY NOTE
Chart review completed. Per RN pt. Is scheduled for surgery today (6/25/20) for right hip fx. Please reconsult post op indicating WB status when appropriate.

## 2020-06-26 ENCOUNTER — APPOINTMENT (OUTPATIENT)
Dept: BEHAVIORAL HEALTH | Age: 80
End: 2020-06-26

## 2020-06-26 DIAGNOSIS — E78.2 MIXED HYPERLIPIDEMIA: ICD-10-CM

## 2020-06-26 PROBLEM — S72.001A HIP FRACTURE REQUIRING OPERATIVE REPAIR, RIGHT, CLOSED, INITIAL ENCOUNTER (HCC): Status: RESOLVED | Noted: 2020-06-25 | Resolved: 2020-06-26

## 2020-06-26 PROBLEM — S72.001A CLOSED FRACTURE OF NECK OF RIGHT FEMUR, INITIAL ENCOUNTER (HCC): Status: RESOLVED | Noted: 2020-06-25 | Resolved: 2020-06-26

## 2020-06-26 LAB
DEPRECATED RDW RBC AUTO: 47.5 FL (ref 35.1–46.3)
ERYTHROCYTE [DISTWIDTH] IN BLOOD BY AUTOMATED COUNT: 13.2 % (ref 11–15)
HAV IGM SER QL: 1.9 MG/DL (ref 1.6–2.6)
HCT VFR BLD AUTO: 35 % (ref 35–48)
HGB BLD-MCNC: 10.9 G/DL (ref 12–16)
MCH RBC QN AUTO: 29.9 PG (ref 26–34)
MCHC RBC AUTO-ENTMCNC: 31.1 G/DL (ref 31–37)
MCV RBC AUTO: 96.2 FL (ref 80–100)
PLATELET # BLD AUTO: 212 10(3)UL (ref 150–450)
RBC # BLD AUTO: 3.64 X10(6)UL (ref 3.8–5.3)
WBC # BLD AUTO: 12.9 X10(3) UL (ref 4–11)

## 2020-06-26 PROCEDURE — 99024 POSTOP FOLLOW-UP VISIT: CPT | Performed by: ORTHOPAEDIC SURGERY

## 2020-06-26 PROCEDURE — 99232 SBSQ HOSP IP/OBS MODERATE 35: CPT | Performed by: HOSPITALIST

## 2020-06-26 RX ORDER — ACETAMINOPHEN 500 MG
1000 TABLET ORAL EVERY 8 HOURS
Status: DISCONTINUED | OUTPATIENT
Start: 2020-06-26 | End: 2020-06-29

## 2020-06-26 RX ORDER — OXYCODONE HYDROCHLORIDE 5 MG/1
2.5 TABLET ORAL EVERY 4 HOURS PRN
Qty: 15 TABLET | Refills: 0 | Status: SHIPPED | OUTPATIENT
Start: 2020-06-26

## 2020-06-26 RX ORDER — OXYCODONE HYDROCHLORIDE 5 MG/1
2.5 TABLET ORAL EVERY 4 HOURS PRN
Status: ACTIVE | OUTPATIENT
Start: 2020-06-26 | End: 2020-06-29

## 2020-06-26 RX ORDER — LOVASTATIN 10 MG/1
TABLET ORAL
Qty: 90 TABLET | Refills: 0 | Status: SHIPPED | OUTPATIENT
Start: 2020-06-26 | End: 2020-09-20

## 2020-06-26 RX ORDER — OXYCODONE HYDROCHLORIDE 5 MG/1
5 TABLET ORAL EVERY 4 HOURS PRN
Status: ACTIVE | OUTPATIENT
Start: 2020-06-26 | End: 2020-06-29

## 2020-06-26 RX ORDER — ACETAMINOPHEN 500 MG
1000 TABLET ORAL EVERY 8 HOURS
Qty: 180 TABLET | Refills: 0 | Status: SHIPPED | OUTPATIENT
Start: 2020-06-26

## 2020-06-26 RX ORDER — ENOXAPARIN SODIUM 100 MG/ML
40 INJECTION SUBCUTANEOUS DAILY
Status: DISCONTINUED | OUTPATIENT
Start: 2020-06-26 | End: 2020-06-26

## 2020-06-26 RX ORDER — LOVASTATIN 20 MG/1
TABLET ORAL
Qty: 90 TABLET | Refills: 0 | Status: SHIPPED | OUTPATIENT
Start: 2020-06-26 | End: 2020-09-20

## 2020-06-26 RX ORDER — ENOXAPARIN SODIUM 100 MG/ML
40 INJECTION SUBCUTANEOUS NIGHTLY
Status: DISCONTINUED | OUTPATIENT
Start: 2020-06-26 | End: 2020-06-29

## 2020-06-26 RX ORDER — TRAMADOL HYDROCHLORIDE 50 MG/1
100 TABLET ORAL EVERY 6 HOURS PRN
Status: DISCONTINUED | OUTPATIENT
Start: 2020-06-26 | End: 2020-06-26

## 2020-06-26 RX ORDER — TRAMADOL HYDROCHLORIDE 50 MG/1
50 TABLET ORAL EVERY 6 HOURS PRN
Status: DISCONTINUED | OUTPATIENT
Start: 2020-06-26 | End: 2020-06-26

## 2020-06-26 RX ORDER — ENOXAPARIN SODIUM 100 MG/ML
40 INJECTION SUBCUTANEOUS NIGHTLY
Qty: 7.2 ML | Refills: 0 | Status: SHIPPED | OUTPATIENT
Start: 2020-06-26 | End: 2020-07-14

## 2020-06-26 NOTE — PROGRESS NOTES
NURSING TRANSFER NOTE      Patient transferred from PACU  Oriented to room. Safety precautions initiated. Bed in low position. Call light in reach.

## 2020-06-26 NOTE — OPERATIVE REPORT
Operative Note    Patient Name/:  Vahid Jordan 1940  Date: 2020  Location: [facility]  Preoperative Diagnosis: Acute closed traumatic right subcapital femoral neck fracture  Postoperative Diagnosis: Same  Operation: Posterior cemented r was split using Bovie and digital separation. A deep Charnley retractor was placed. The limb was then internally rotated, the gluteus medius was identified, and a blunt cobra retractor was placed underneath.   A Monique was used to identify the gluteus minim palpated, and it was noted to be bony impingement. I did determine that I would internally rotate the final stem further, however I also decided to go with a high offset implant. High offset neck with the +3.5 head did get adequate stability.   It was sta Biomet Advocate cemented stem, high offset, 44 mm +3.5 bipolar head  Drains: None  Condition: Good  Disposition: Floor    -Weightbearing as tolerated, PT OT, posterior hip precautions  -Pain control per APS  -Perioperative antibiotics ordered  -DVT prophyl

## 2020-06-26 NOTE — PROGRESS NOTES
[facility]PITALIST  Progress Note     Yolanda Price Patient Status:  Inpatient    1940 MRN [id number]   [facility] 3SW-A Attending Josef Huffman MD   Hosp Day # 1 PCP Zack Corey     Chief Complaint: Hip fracture    S: Patient with Nightly   • Venlafaxine HCl ER  150 mg Oral Daily   • hydrocortisone   Topical BID   • ropivacaine/morphine/ketorolac/epinephrine pain cocktail   Injection Once (Intra-Op)   • [MAR Hold] povidone-iodine in 0.9% NaCl irrigation   Irrigation Once   • [facility]

## 2020-06-26 NOTE — PROGRESS NOTES
[facility] Orth[facility] Progress Note    Subjective: No acute events. Pain well controlled. Walked around room w/ PT with walker. Tolerating diet, no n/v. Urinating w/ Purewick.     Objective: Awake, alert, no distress  Sitting up in bedside chair  Fires ehl/ta/gsc  SILT

## 2020-06-26 NOTE — PLAN OF CARE
Pt received from PACU, accompanied by . She was slightly drowsy but easy to arouse. Able to participate with assessment, follows commands. AOX2-3, reorientation done. Refused to eat, able to offer apple sauce.  Takes her meds whole without any proble

## 2020-06-26 NOTE — OCCUPATIONAL THERAPY NOTE
OCCUPATIONAL THERAPY EVALUATION - INPATIENT     Room Number: 351/351-A  Evaluation Date: 6/26/2020  Type of Evaluation: Initial  Presenting Problem: s/p R hip hemiarthroplasty 6/25/20    Physician Order: IP Consult to Occupational Therapy  Reason for Thera know why I'm here\"    Patient self-stated goal is not stated. OBJECTIVE  Precautions: MARCIAL - posterior;Bed/chair alarm; Hip abduction pillow  Fall Risk: High fall risk    WEIGHT BEARING RESTRICTION  Weight Bearing Restriction: R lower extremity        R much help from another person does the patient currently need…  -   Putting on and taking off regular lower body clothing?: Total  -   Bathing (including washing, rinsing, drying)?: A Lot  -   Toileting, which includes using toilet, bedpan or urinal? : Tot occupational profile noted above. Functional outcome measures completed include: AM-PAC \"6 Clicks\".  In this OT evaluation patient presents with the following performance deficits: increased pain, decreased balance, decreased endurance, decreased knowledg TBD    PLAN  OT Treatment Plan: Balance activities; ADL training;Functional transfer training; Endurance training;Patient/Family education;Patient/Family training; Compensatory technique education  Rehab Potential : Good  Frequency (Obs): 3-5x/week  Number of

## 2020-06-26 NOTE — ANESTHESIA POSTPROCEDURE EVALUATION
Anoop Patient Status:  Inpatient   Age/Gender 70year old female MRN [id number]   Location [facility] Attending Shaan Tiwari MD   Hosp Day # 0 PCP [facility]       Anesthesia Post-op Note    Pr

## 2020-06-26 NOTE — PROGRESS NOTES
[facility] Note:  Renal Dose Adjustment for Metoclopramide (REGLAN)    Farzaneh Rivas has been prescribed Metoclopramide (REGLAN) 10 mg IV every 6 hours as needed for nausea/vomiting.     Estimated Creatinine Clearance: 30.6 mL/min (A) (based on SCr of 1

## 2020-06-26 NOTE — PLAN OF CARE
Pt  is AOx2-3, forgetful at times, on R. A.  VS are stable, had some arrhthymias with PACs, asymptomatic, checking labs this morning. IVF infusing, Able to lift legs,  denies n/t. Aquacel to right hip is cdi. Gel ice/abductor pillow in place.   Pt c/o mi

## 2020-06-26 NOTE — PAYOR COMM NOTE
--------------  ADMISSION REVIEW     Payor: [facility] Enio Jose [place] #:  [id number]  Authorization Number: [id number]    Admit date: 6/25/20  Admit time: 0119       Patient Seen in: [facility] Emergency Department    History   Patient pre Globulin  4.5 (*)     A/G Ratio 0.8 (*)    EKG    Rate, intervals and axes as noted on EKG Report. Rate: 90  Rhythm: Sinus Rhythm  Reading: Sinus rhythm without acute ischemic changes. Imaging personally reviewed.   X-ray does show a right femoral neck nontender, nondistended. Positive bowel sounds. No rebound, guarding or organomegaly. Neurologic: No focal neurological deficits. CNII-XII grossly intact. Musculoskeletal: Moves all extremities. Right hip tender.   Right lower extremity longer than left atorvastatin (LIPITOR) tab 10 mg     Date Action Dose Route User    6/26/2020 0002 Given 10 mg Oral Sudha Castro RN         ceFAZolin sodium (ANCEF/KEFZOL) 2 GM/20ML premix IV syringe 2 g     Date Action Dose Route User    6/26/2020 0845 Given

## 2020-06-26 NOTE — PROGRESS NOTES
Post Op Day 1 Ortho Note: Posterior cemented right hip hemiarthroplasty  Pt did not received PNB. Assessed pt in bed with  at bedside. Baseline mental status confused r/t dementia. Able to answer and ask questions appropriately.  Denies pain at re

## 2020-06-26 NOTE — PHYSICAL THERAPY NOTE
PHYSICAL THERAPY HIP EVALUATION - INPATIENT     Room Number: 351/351-A  Evaluation Date: 6/26/2020Type of Evaluation: Initial  Physician Order: PT Eval and Treat    Presenting Problem: (s/p right hp fx, s/p r hemiarthoplasty )  Reason for Therapy: Mobility REPLACEMENT/HEMIARTHROPLASTY Right 6/25/2020    Performed by Mikie Barnes MD at [address] [place] [address]  Type of Home: House   Home Layout: Two level        Stairs to Bedroom: 12  Railing: Yes    Lives With: Spouse;Caregiver 24 hours  Drives: No O2 WALK                  AM-PAC '6-Clicks' INPATIENT SHORT FORM - BASIC MOBILITY  How much difficulty does the patient currently have. ..  -   Turning over in bed (including adjusting bedclothes, sheets and blankets)?: A Lot   -   Sitting down on and st max 2, and positon in recliner mod assist 2, with cues for sequence of sliding hips back into chair. Verbally reviewed post hip precautions with pt. And spouse; however pt was unable to recall or repeat the precautions when asked.  .    Exercise/Educatio return home safely, ELOS 18-21 days . Based on this evaluation, patient's clinical presentation is evolving and overall the evaluation complexity is considered moderate.   These impairments and comorbidities manifest themselves as functional limitations i

## 2020-06-26 NOTE — CM/SW NOTE
Met with pt's  to discuss DC planning. He stated PT/OT worked with pt today and recommend [facility] - notes not yet in the chart. He is agreeable with LYNN and would like referrals sent. Referrals sent via [address].   Therapy notes will need to be added whe

## 2020-06-26 NOTE — CM/SW NOTE
Met with pt/ to discuss DC planning. Pt accepted at [facility] and this is pt's 's preference. Spoke with Toshia Dyson from [facility] who stated [employer] will be needed prior to DC.   Await insurance approval and medical clearance for

## 2020-06-27 PROBLEM — S72.001A CLOSED FRACTURE OF NECK OF RIGHT FEMUR (HCC): Status: ACTIVE | Noted: 2020-06-25

## 2020-06-27 LAB
ANION GAP SERPL CALC-SCNC: 5 MMOL/L (ref 0–18)
BASOPHILS # BLD AUTO: 0.02 X10(3) UL (ref 0–0.2)
BASOPHILS NFR BLD AUTO: 0.1 %
BUN BLD-MCNC: 16 MG/DL (ref 7–18)
BUN/CREAT SERPL: 20 (ref 10–20)
CALCIUM BLD-MCNC: 8.5 MG/DL (ref 8.5–10.1)
CHLORIDE SERPL-SCNC: 112 MMOL/L (ref 98–112)
CO2 SERPL-SCNC: 24 MMOL/L (ref 21–32)
CREAT BLD-MCNC: 0.8 MG/DL (ref 0.55–1.02)
DEPRECATED RDW RBC AUTO: 46.5 FL (ref 35.1–46.3)
EOSINOPHIL # BLD AUTO: 0 X10(3) UL (ref 0–0.7)
EOSINOPHIL NFR BLD AUTO: 0 %
ERYTHROCYTE [DISTWIDTH] IN BLOOD BY AUTOMATED COUNT: 13.5 % (ref 11–15)
GLUCOSE BLD-MCNC: 97 MG/DL (ref 70–99)
HCT VFR BLD AUTO: 31.8 % (ref 35–48)
HGB BLD-MCNC: 10 G/DL (ref 12–16)
IMM GRANULOCYTES # BLD AUTO: 0.08 X10(3) UL (ref 0–1)
IMM GRANULOCYTES NFR BLD: 0.6 %
LYMPHOCYTES # BLD AUTO: 1.59 X10(3) UL (ref 1–4)
LYMPHOCYTES NFR BLD AUTO: 11.8 %
MCH RBC QN AUTO: 29.4 PG (ref 26–34)
MCHC RBC AUTO-ENTMCNC: 31.4 G/DL (ref 31–37)
MCV RBC AUTO: 93.5 FL (ref 80–100)
MONOCYTES # BLD AUTO: 1.15 X10(3) UL (ref 0.1–1)
MONOCYTES NFR BLD AUTO: 8.5 %
NEUTROPHILS # BLD AUTO: 10.66 X10 (3) UL (ref 1.5–7.7)
NEUTROPHILS # BLD AUTO: 10.66 X10(3) UL (ref 1.5–7.7)
NEUTROPHILS NFR BLD AUTO: 79 %
OSMOLALITY SERPL CALC.SUM OF ELEC: 293 MOSM/KG (ref 275–295)
PLATELET # BLD AUTO: 223 10(3)UL (ref 150–450)
POTASSIUM SERPL-SCNC: 4.1 MMOL/L (ref 3.5–5.1)
RBC # BLD AUTO: 3.4 X10(6)UL (ref 3.8–5.3)
SODIUM SERPL-SCNC: 141 MMOL/L (ref 136–145)
WBC # BLD AUTO: 13.5 X10(3) UL (ref 4–11)

## 2020-06-27 PROCEDURE — 99232 SBSQ HOSP IP/OBS MODERATE 35: CPT | Performed by: HOSPITALIST

## 2020-06-27 NOTE — PLAN OF CARE
Pain controlled. Denies numbness/tingling to RLE. Aquacel CDI to RLE. VSS on RA. SCDs and heel protectors on bilaterally. Voiding, gave Miralax and is passing gas. Up to [facility] and chair with mod assist x 2 with walker. Fall precautions in place.  Waiting on i

## 2020-06-27 NOTE — CM/SW NOTE
11am  MSW spoke to RN who states is ready for dc. MSW called [facility] and left message for admissions to see if they can take patient.      1:15pm  MSW called by Esperanza Pichardo they can not take pt until they have insurance auth which will not be before Mo

## 2020-06-27 NOTE — PLAN OF CARE
A&oX2-3. Forgetful. Pain controlled with sched Tylenol. PRN ice therapy. Aquacel dressing is clean and intact. HR up to 120s at times when at rest. Hospitalist notified. Orders received. Pt asymptomatic. Voiding up in [facility]. Up with mod assist x2. SCDs on.  C

## 2020-06-27 NOTE — PROGRESS NOTES
[facility]ITAL[facility]  Progress Note     Mirtha Sunshine Patient Status:  Inpatient    1940 MRN [id number]   [facility] 3SW-A Attending Deepthi Ashley MD   Hosp Day # 2 PCP Vicky Noguera     Chief Complaint: Hip fracture    S: Patient with enoxaparin  40 mg Subcutaneous Nightly   • acetaminophen  1,000 mg Oral Q8H   • Donepezil HCl  10 mg Oral Nightly   • Levothyroxine Sodium  50 mcg Oral Before breakfast   • atorvastatin  10 mg Oral Nightly   • Venlafaxine HCl ER  150 mg Oral Daily   • hydr

## 2020-06-28 PROCEDURE — 99232 SBSQ HOSP IP/OBS MODERATE 35: CPT | Performed by: HOSPITALIST

## 2020-06-28 NOTE — PROGRESS NOTES
[facility]ITAL[facility]  Progress Note     Mee Sandoval Patient Status:  Inpatient    1940 MRN [id number]   [facility] 3SW-A Attending Trini Sexton MD   Hosp Day # 3 PCP Marci Hartman     Chief Complaint: Hip fracture    S: Patient doin enoxaparin  40 mg Subcutaneous Nightly   • acetaminophen  1,000 mg Oral Q8H   • Donepezil HCl  10 mg Oral Nightly   • Levothyroxine Sodium  50 mcg Oral Before breakfast   • atorvastatin  10 mg Oral Nightly   • Venlafaxine HCl ER  150 mg Oral Daily   • hydr

## 2020-06-28 NOTE — PLAN OF CARE
Pt A&O. Can be forgetful at times. Sometimes can be slow with responses. On room air. Encouraged use of incentive spirometer and ankle pump exercises every hour while awake. Tele and  monitoring maintained. Tolerating regular diet with good appetite.  Giovani Johnson

## 2020-06-28 NOTE — PLAN OF CARE
A&Ox3-4. Forgetful. Pain controlled with sched Tylenol. Aquacel dressing is clean and intact. Tolerating well getting up to chair/BSC, with max assist x2. VSS. Sinus tachy at times when at rest and when oob. Fall precautions in place.  Will continue to [facility]

## 2020-06-29 ENCOUNTER — EXTERNAL FACILITY (OUTPATIENT)
Dept: FAMILY MEDICINE CLINIC | Facility: CLINIC | Age: 80
End: 2020-06-29

## 2020-06-29 VITALS
HEART RATE: 100 BPM | HEIGHT: 61 IN | BODY MASS INDEX: 24.92 KG/M2 | DIASTOLIC BLOOD PRESSURE: 91 MMHG | TEMPERATURE: 99 F | SYSTOLIC BLOOD PRESSURE: 111 MMHG | WEIGHT: 132 LBS | OXYGEN SATURATION: 98 % | RESPIRATION RATE: 16 BRPM

## 2020-06-29 DIAGNOSIS — S72.001S CLOSED FRACTURE OF RIGHT HIP, SEQUELA: ICD-10-CM

## 2020-06-29 DIAGNOSIS — Z96.649 S/P HIP HEMIARTHROPLASTY: ICD-10-CM

## 2020-06-29 DIAGNOSIS — F03.90 DEMENTIA WITHOUT BEHAVIORAL DISTURBANCE, UNSPECIFIED DEMENTIA TYPE (HCC): ICD-10-CM

## 2020-06-29 DIAGNOSIS — I10 ESSENTIAL HYPERTENSION: ICD-10-CM

## 2020-06-29 DIAGNOSIS — Z47.89 ORTHOPEDIC AFTERCARE: Primary | ICD-10-CM

## 2020-06-29 DIAGNOSIS — G91.2 NPH (NORMAL PRESSURE HYDROCEPHALUS) (HCC): ICD-10-CM

## 2020-06-29 PROCEDURE — 99239 HOSP IP/OBS DSCHRG MGMT >30: CPT | Performed by: HOSPITALIST

## 2020-06-29 PROCEDURE — 1111F DSCHRG MED/CURRENT MED MERGE: CPT | Performed by: FAMILY MEDICINE

## 2020-06-29 PROCEDURE — 99306 1ST NF CARE HIGH MDM 50: CPT | Performed by: FAMILY MEDICINE

## 2020-06-29 NOTE — PLAN OF CARE
Spouse aware of transfer to [facility] at 4 pm via ambulance today. Report called to DEEPALI Appiah @ [address] [place] [address]. Prescriptions for lovenox, oxycodone, senna, acetaminophen enclosed in discharge paperwork. Awaiting ambulance.

## 2020-06-29 NOTE — PROGRESS NOTES
[facility] HOSPITALIST  Progress Note     Junaid Phillip Patient Status:  Inpatient    1940 MRN [id number]   [facility] 3SW-A Attending Jenny Wolfe MD   Hosp Day # 4 PCP Cary Apley     Chief Complaint: Hip fracture    S: Patient doin enoxaparin  40 mg Subcutaneous Nightly   • acetaminophen  1,000 mg Oral Q8H   • Donepezil HCl  10 mg Oral Nightly   • Levothyroxine Sodium  50 mcg Oral Before breakfast   • atorvastatin  10 mg Oral Nightly   • Venlafaxine HCl ER  150 mg Oral Daily   • hydr

## 2020-06-29 NOTE — CM/SW NOTE
Insurance auth obtained. Patient will discharge to [facility]  [id number] today by [facility] [place] [facility]  PCS form completed in Pending [facility] to [facility] [address]. RN aware and will inform family.     Gary Stewart LCSW

## 2020-06-29 NOTE — OCCUPATIONAL THERAPY NOTE
OCCUPATIONAL THERAPY TREATMENT NOTE - INPATIENT     Room Number: 351/351-A  Session: 1   Number of Visits to Meet Established Goals: 5    Presenting Problem: s/p R hip hemiarthroplasty    History related to current admission: Patient is 70year old female toilet, bedpan or urinal? : Total  -   Putting on and taking off regular upper body clothing?: A Little  -   Taking care of personal grooming such as brushing teeth?: A Little  -   Eating meals?: A Little    AM-PAC Score:  Score: 13  Approx Degree of Impai level of function. Continue to recommend L[facility]NN at discharge. OT Discharge Recommendations: Sub-acute rehabilitation(elos 18-21 days)  OT Device Recommendations: TBD    PLAN  OT Treatment Plan: Balance activities; ADL training;Functional transfer training;E

## 2020-06-29 NOTE — CM/SW NOTE
MSW spoke with Patrcia Duane from [address] [facility]  Y:[phone]. They are still waiting for authorization from the insurance company. LESLY will continue to follow for dc to ANSHUL España LCSW

## 2020-06-29 NOTE — PLAN OF CARE
Pain well controlled. Up with max assist x2 to [facility]. Tolerating well. Denies N/T. VSS. Tele monitoring. On RA. Fall precautions in place. Will continue to monitor.

## 2020-06-30 NOTE — PHYSICAL THERAPY NOTE
PHYSICAL THERAPY HIP TREATMENT NOTE - INPATIENT      Room Number: 906/612-O     Session: 2  Number of Visits to Meet Established Goals: 2    Presenting Problem: (s/p right hp fx, s/p r hemiarthoplasty )    Problem List  Principal Problem:    Closed fractur chair (including a wheelchair)?: A Lot   -   Need to walk in hospital room?: A Little   -   Climbing 3-5 steps with a railing?: Total       AM-PAC Score:  Raw Score: 14   Approx Degree of Impairment: 61.29%   Standardized Score (AM-PAC Scale): 38.1   CMS M supervision     Goal #2  Patient is able to demonstrate transfers Sit to/from Stand at assistance level: supervision     Goal #3    Patient is able to ambulate 300 feet with assistive device at assistance level: modified independent    Goal #4    Patient w

## 2020-06-30 NOTE — DISCHARGE SUMMARY
[facility] HOSPITALIST  DISCHARGE SUMMARY     Rajendra Garza Patient Status:  Inpatient    1940 MRN [id number]   [facility] 3SW-A Attending No att. providers found   Hosp Day # 4 PCP Man Hernandes     Date of Admission: 2020  Date of 2020  Quantity:  7.2 mL  Refills:  0     oxyCODONE HCl 5 MG Tabs  Commonly known as:  ROXICODONE      Take 0.5 tablets (2.5 mg total) by mouth every 4 (four) hours as needed for Pain.    Quantity:  15 tablet  Refills:  0     Sennosides 17.2 MG Tabs      [facility]    Meena Dunne MD  [address] [facility],[place] [address]  [phone]    Go on 7/10/2020  For postop follow up at 2:00pm    Ousmane Patino  [address] [place] [address]  [phone]          Appointments for Next 30 Days 6/30/2020

## 2020-06-30 NOTE — PAYOR COMM NOTE
--------------  DISCHARGE REVIEW    Payor: [facility] Enio Jose [place] #:  [id number]  Authorization Number: [id number]    Admit date: 6/25/20  Admit time:  0119  Discharge Date: 6/29/2020  4:20 PM     Admitting Physician: Tomás Richard MD  Atte

## 2020-06-30 NOTE — PROGRESS NOTES
Cal Mark Author: Edelmira Galdamez MD     1940 MRN [id number]   [facility]  Admission 20      Last Hospital Discharge 20 PCP [facility] of Discharge  [facility]        CC --admitted to [address] [place] [address] for hightower nightly for 18 days. 7.2 mL 0   • Senna 17.2 MG Oral Tab Take 1 tablet (17.2 mg total) by mouth nightly. 30 tablet 0   • oxyCODONE HCl 5 MG Oral Tab Take 0.5 tablets (2.5 mg total) by mouth every 4 (four) hours as needed for Pain.  15 tablet 0   • Methylphe sensory are grossly intact      DIAGNOSTICS REVIEWED AT THIS VISIT:        Labs:        Recent Labs   Lab 06/24/20  2206 06/26/20  0440 06/27/20  0723   WBC 9.8 12.9* 13.5*   HGB 13.2 10.9* 10.0*   MCV 90.1 96.2 93.5   .0 212.0 223.0   INR 1.01  --

## 2020-07-01 ENCOUNTER — INITIAL APN SNF VISIT (OUTPATIENT)
Dept: INTERNAL MEDICINE CLINIC | Age: 80
End: 2020-07-01

## 2020-07-01 VITALS
HEART RATE: 67 BPM | SYSTOLIC BLOOD PRESSURE: 135 MMHG | DIASTOLIC BLOOD PRESSURE: 66 MMHG | RESPIRATION RATE: 20 BRPM | TEMPERATURE: 98 F | OXYGEN SATURATION: 96 %

## 2020-07-01 DIAGNOSIS — I10 ESSENTIAL HYPERTENSION: ICD-10-CM

## 2020-07-01 DIAGNOSIS — F03.90 DEMENTIA WITHOUT BEHAVIORAL DISTURBANCE, UNSPECIFIED DEMENTIA TYPE (HCC): ICD-10-CM

## 2020-07-01 DIAGNOSIS — S72.001D CLOSED FRACTURE OF NECK OF RIGHT FEMUR WITH ROUTINE HEALING, SUBSEQUENT ENCOUNTER: Primary | ICD-10-CM

## 2020-07-01 DIAGNOSIS — Z91.81 HISTORY OF BAD FALL: ICD-10-CM

## 2020-07-01 PROCEDURE — 99310 SBSQ NF CARE HIGH MDM 45: CPT | Performed by: NURSE PRACTITIONER

## 2020-07-01 NOTE — PROGRESS NOTES
Rajendra Garza  : 1940  Age 70year old  female patient is admitted to Facility: [address] [place] [address] [address] Residence for [address] [place] [address] date:  20  Discharge date to [facility]  20  ELOS:  18-21 days  Anticipated discharge date:  20  A hyperlipidemia      Past Surgical History:   Procedure Laterality Date   • CEMENTED TOTAL HIP REPLACEMENT/HEMIARTHROPLASTY Right 6/25/2020    Performed by Demetrio Banks MD at [facility] MAIN OR     Family History   Problem Relation Age of Onset   • Heart Disorde denies shortness of breath, wheezing or cough   CARDIOVASCULAR:denies chest pain, no palpitations   GI: denies nausea, vomiting, constipation, diarrhea; no rectal bleeding; no heartburn  :no dysuria, urgency or frequency; no vaginal discharge; no urinary TP 8.2 06/24/2020    ALB 3.7 06/24/2020    GLOBULIN 4.5 (H) 06/24/2020     06/27/2020    K 4.1 06/27/2020     06/27/2020    CO2 24.0 06/27/2020       Lab Results   Component Value Date    WBC 13.5 (H) 06/27/2020    RBC 3.40 (L) 06/27/2020    HG deficiencies  Caltrate plus Vit D; 1 tab po daily   CO Q-10 po daily     Follow-ups after [facility] discharge: Follow-Up with PCP within 1-2 weeks following [facility] discharge. Follow-Up with specialists as recommended.     MARIA ANTONIA Ward  07/01/20

## 2020-07-05 ENCOUNTER — EXTERNAL FACILITY (OUTPATIENT)
Dept: FAMILY MEDICINE CLINIC | Facility: CLINIC | Age: 80
End: 2020-07-05

## 2020-07-05 DIAGNOSIS — Z47.89 ORTHOPEDIC AFTERCARE: Primary | ICD-10-CM

## 2020-07-05 DIAGNOSIS — F03.90 DEMENTIA WITHOUT BEHAVIORAL DISTURBANCE, UNSPECIFIED DEMENTIA TYPE (HCC): ICD-10-CM

## 2020-07-05 DIAGNOSIS — Z96.649 S/P HIP HEMIARTHROPLASTY: ICD-10-CM

## 2020-07-05 DIAGNOSIS — I10 ESSENTIAL HYPERTENSION: ICD-10-CM

## 2020-07-05 PROCEDURE — 99309 SBSQ NF CARE MODERATE MDM 30: CPT | Performed by: FAMILY MEDICINE

## 2020-07-06 ENCOUNTER — SNF VISIT (OUTPATIENT)
Dept: INTERNAL MEDICINE CLINIC | Age: 80
End: 2020-07-06

## 2020-07-06 DIAGNOSIS — Z91.81 HISTORY OF BAD FALL: ICD-10-CM

## 2020-07-06 DIAGNOSIS — Z79.899 MEDICATION MANAGEMENT: ICD-10-CM

## 2020-07-06 DIAGNOSIS — S72.001D CLOSED FRACTURE OF NECK OF RIGHT FEMUR WITH ROUTINE HEALING, SUBSEQUENT ENCOUNTER: Primary | ICD-10-CM

## 2020-07-06 DIAGNOSIS — R52 PAIN: ICD-10-CM

## 2020-07-06 PROCEDURE — 99308 SBSQ NF CARE LOW MDM 20: CPT | Performed by: NURSE PRACTITIONER

## 2020-07-07 ENCOUNTER — TELEPHONE (OUTPATIENT)
Dept: FAMILY MEDICINE | Age: 80
End: 2020-07-07

## 2020-07-07 VITALS
TEMPERATURE: 98 F | OXYGEN SATURATION: 96 % | RESPIRATION RATE: 20 BRPM | SYSTOLIC BLOOD PRESSURE: 136 MMHG | HEART RATE: 90 BPM | DIASTOLIC BLOOD PRESSURE: 83 MMHG

## 2020-07-07 RX ORDER — LIDOCAINE 50 MG/G
1 PATCH TOPICAL EVERY 24 HOURS
COMMUNITY

## 2020-07-07 NOTE — PROGRESS NOTES
Cristino Soto, 9/22/1940, 70year old, female    Chief Complaint:  Patient presents with:   Follow - Up  Musculoskeletal Problem  Weakness  Pain  Med Reconcilliation    [facility] Admit date:  6/24/20  Discharge date to [facility]:  6/29/20  BRANDI:  18-21 saroj POA/family as appropriate  LYNN team/ & discharge planner to assist with establishing safe discharge plan for next level of care     DVT Prophylaxis   Lovenox 40mg SC x 18 days; END DATE 7/14/20     Right hip hemiarthroplasty r/t fall with frac

## 2020-07-07 NOTE — PROGRESS NOTES
Rajendra aGrza Author: Clearance MD Neil     1940 MRN [id number]   [facility]  Admission 20      Last Hospital Discharge 20 PCP [facility] of Discharge  [facility]        CC -follow-up subacute rehab, right hip fractur Outpatient Medications   Medication Sig Dispense Refill   • acetaminophen 500 MG Oral Tab Take 2 tablets (1,000 mg total) by mouth every 8 (eight) hours.  180 tablet 0   • Enoxaparin Sodium 40 MG/0.4ML Subcutaneous Solution Inject 0.4 mL (40 mg total) into auscultation  CARDIO: RRR without murmur  GI: good BS's, no masses, HSM or tenderness  : deferred  RECTAL: deferred  MUSCULOSKELETAL: back is not tender, FROM of the back  EXTREMITIES: no cyanosis, clubbing or edema  NEURO: Oriented times three, cranial

## 2020-07-08 ENCOUNTER — SNF VISIT (OUTPATIENT)
Dept: INTERNAL MEDICINE CLINIC | Age: 80
End: 2020-07-08

## 2020-07-08 ENCOUNTER — MED REC SCAN ONLY (OUTPATIENT)
Dept: FAMILY MEDICINE CLINIC | Facility: CLINIC | Age: 80
End: 2020-07-08

## 2020-07-08 VITALS
OXYGEN SATURATION: 96 % | DIASTOLIC BLOOD PRESSURE: 62 MMHG | SYSTOLIC BLOOD PRESSURE: 137 MMHG | RESPIRATION RATE: 18 BRPM | HEART RATE: 87 BPM | TEMPERATURE: 98 F

## 2020-07-08 DIAGNOSIS — Z74.09 IMPAIRED MOBILITY AND ADLS: ICD-10-CM

## 2020-07-08 DIAGNOSIS — F03.90 DEMENTIA WITHOUT BEHAVIORAL DISTURBANCE, UNSPECIFIED DEMENTIA TYPE (HCC): ICD-10-CM

## 2020-07-08 DIAGNOSIS — Z78.9 IMPAIRED MOBILITY AND ADLS: ICD-10-CM

## 2020-07-08 DIAGNOSIS — R29.6 FREQUENT FALLS: ICD-10-CM

## 2020-07-08 DIAGNOSIS — Z96.641 HISTORY OF RIGHT HIP HEMIARTHROPLASTY: Primary | ICD-10-CM

## 2020-07-08 PROCEDURE — 99308 SBSQ NF CARE LOW MDM 20: CPT | Performed by: NURSE PRACTITIONER

## 2020-07-08 NOTE — PROGRESS NOTES
Derek Crenshaw, 9/22/1940, 70year old, female    Chief Complaint:  Patient presents with:   Follow - Up  Musculoskeletal Problem  Fall  Test Results  Wound     [facility] Admit date:  6/24/20  Discharge date to [facility]:  6/29/20  ELOS:  18-21 days  Antic no rashes, no suspicious lesions  WOUND: right hip s/p hemiarthroplasty; edges well-approximated, no s/s infection, healing well  EYES: conjunctiva normal, no drainage from eyes  HENT: normocephalic; normal nose, no nasal drainage, mucous membranes pink, m BID  Sennosides 17.2mg po nightly     Vitamins/supplements as r/t deficiencies  Caltrate plus Vit D; 1 tab po daily   CO Q-10 po daily      Follow-ups after LYNN discharge:   Follow-Up with PCP within 1-2 weeks following LYNN discharge.      Follow-Up with sp

## 2020-07-10 ENCOUNTER — OFFICE VISIT (OUTPATIENT)
Dept: ORTHOPEDICS CLINIC | Facility: CLINIC | Age: 80
End: 2020-07-10
Payer: COMMERCIAL

## 2020-07-10 VITALS — HEART RATE: 105 BPM | OXYGEN SATURATION: 98 %

## 2020-07-10 DIAGNOSIS — S72.001D CLOSED FRACTURE OF NECK OF RIGHT FEMUR WITH ROUTINE HEALING, SUBSEQUENT ENCOUNTER: Primary | ICD-10-CM

## 2020-07-10 PROCEDURE — 99024 POSTOP FOLLOW-UP VISIT: CPT | Performed by: ORTHOPAEDIC SURGERY

## 2020-07-10 NOTE — PROGRESS NOTES
[facility] Progress Note    Subjective: Patient returns 2 weeks postop from posterior right hip cemented hemiarthroplasty for femoral neck fracture.   At the visit today patient is accompanied by her , and her daughter is present on speaker sudheer

## 2020-07-13 ENCOUNTER — SNF VISIT (OUTPATIENT)
Dept: INTERNAL MEDICINE CLINIC | Age: 80
End: 2020-07-13

## 2020-07-13 VITALS
OXYGEN SATURATION: 97 % | TEMPERATURE: 98 F | DIASTOLIC BLOOD PRESSURE: 70 MMHG | RESPIRATION RATE: 16 BRPM | SYSTOLIC BLOOD PRESSURE: 158 MMHG | HEART RATE: 60 BPM

## 2020-07-13 DIAGNOSIS — R52 PAIN: ICD-10-CM

## 2020-07-13 DIAGNOSIS — Z96.641 HISTORY OF RIGHT HIP HEMIARTHROPLASTY: Primary | ICD-10-CM

## 2020-07-13 DIAGNOSIS — Z79.899 MEDICATION MANAGEMENT: ICD-10-CM

## 2020-07-13 DIAGNOSIS — G47.9 DIFFICULTY SLEEPING: ICD-10-CM

## 2020-07-13 PROCEDURE — 99309 SBSQ NF CARE MODERATE MDM 30: CPT | Performed by: NURSE PRACTITIONER

## 2020-07-13 RX ORDER — TRAMADOL HYDROCHLORIDE 50 MG/1
50 TABLET ORAL EVERY 4 HOURS PRN
COMMUNITY

## 2020-07-13 RX ORDER — MELATONIN
3 NIGHTLY
COMMUNITY

## 2020-07-13 NOTE — PROGRESS NOTES
Mee Sandoval, 9/22/1940, 70year old, female    Chief Complaint:  Patient presents with:   Follow - Up  Musculoskeletal Problem  Pain  Weakness     [facility] Admit date:  6/24/20  Discharge date to [facility]:  6/29/20  ELOS:  18-21 days  Anticipated disc patient/staff. Verified call light is within reach.     Objective:  /70   Pulse 60   Temp 97.8 °F (36.6 °C)   Resp 16   SpO2 97%   PHYSICAL EXAM:  GENERAL HEALTH: well developed, well nourished, in no apparent distress, in bed  LINES, TUBES, DRAINS: post-LYNN     Dementia, Major Depressive Disorder  Effexor 150mg po daily   Ritalin 10mg po daily     Hypothyroidism  Levothryoxine 50mcg po daily      Bowel Management Regimen/Constipation   Docusate 100mg po BID  Sennosides 17.2mg po nightly     Vitamins/

## 2020-07-16 ENCOUNTER — SNF DISCHARGE (OUTPATIENT)
Dept: INTERNAL MEDICINE CLINIC | Age: 80
End: 2020-07-16

## 2020-07-16 VITALS
DIASTOLIC BLOOD PRESSURE: 69 MMHG | SYSTOLIC BLOOD PRESSURE: 131 MMHG | TEMPERATURE: 98 F | HEART RATE: 79 BPM | RESPIRATION RATE: 18 BRPM | OXYGEN SATURATION: 97 %

## 2020-07-16 DIAGNOSIS — Z91.81 HISTORY OF FALLING: ICD-10-CM

## 2020-07-16 DIAGNOSIS — Z96.641 HISTORY OF RIGHT HIP HEMIARTHROPLASTY: Primary | ICD-10-CM

## 2020-07-16 DIAGNOSIS — Z76.0 PRESCRIPTION REFILL: ICD-10-CM

## 2020-07-16 DIAGNOSIS — Z79.899 MEDICATION MANAGEMENT: ICD-10-CM

## 2020-07-16 DIAGNOSIS — F03.90 MILD DEMENTIA (HCC): ICD-10-CM

## 2020-07-16 PROCEDURE — 99316 NF DSCHRG MGMT 30 MIN+: CPT | Performed by: NURSE PRACTITIONER

## 2020-07-16 PROCEDURE — 3078F DIAST BP <80 MM HG: CPT | Performed by: NURSE PRACTITIONER

## 2020-07-16 PROCEDURE — 3075F SYST BP GE 130 - 139MM HG: CPT | Performed by: NURSE PRACTITIONER

## 2020-07-20 ENCOUNTER — TELEPHONE (OUTPATIENT)
Dept: INTERNAL MEDICINE | Age: 80
End: 2020-07-20

## 2020-07-20 RX ORDER — ASPIRIN 325 MG
325 TABLET ORAL DAILY
COMMUNITY
Start: 2020-07-11 | End: 2020-08-01

## 2020-07-20 NOTE — PROGRESS NOTES
Elan العلي, 9/22/1940, 70year old, female is being discharged from Facility: [address] [place] [address]    Date of Admission: 6/29/20  Date of Discharge: 7/18/20                              Admitting Diagnoses:  Right hip fx s/p PCP/specialists    Discharge Diagnoses w/ current management:  Physical Deconditioning/Weakness  Home therapies to evaluate and treat    DVT Prophylaxis   As of 7/17 per Dr. Gomez Stewart, ASA x 3 weeks     Right hip hemiarthroplasty r/t fall with fracture  PT/

## 2020-07-23 ENCOUNTER — TELEPHONE (OUTPATIENT)
Dept: BEHAVIORAL HEALTH | Age: 80
End: 2020-07-23

## 2020-07-27 ENCOUNTER — V-VISIT (OUTPATIENT)
Dept: BEHAVIORAL HEALTH | Age: 80
End: 2020-07-27

## 2020-07-27 DIAGNOSIS — F34.1 PERSISTENT DEPRESSIVE DISORDER: ICD-10-CM

## 2020-07-27 DIAGNOSIS — F03.90 MAJOR NEUROCOGNITIVE DISORDER (CMD): Primary | ICD-10-CM

## 2020-07-27 PROCEDURE — 99214 OFFICE O/P EST MOD 30 MIN: CPT | Performed by: PSYCHIATRY & NEUROLOGY

## 2020-07-27 RX ORDER — RISPERIDONE 0.25 MG/1
0.25 TABLET ORAL 2 TIMES DAILY
Qty: 60 TABLET | Refills: 1 | Status: SHIPPED | OUTPATIENT
Start: 2020-07-27 | End: 2020-09-16 | Stop reason: SDUPTHER

## 2020-07-28 ENCOUNTER — TELEPHONE (OUTPATIENT)
Dept: INTERNAL MEDICINE | Age: 80
End: 2020-07-28

## 2020-07-28 DIAGNOSIS — F02.818 DEMENTIA ASSOCIATED WITH OTHER UNDERLYING DISEASE WITH BEHAVIORAL DISTURBANCE (CMD): ICD-10-CM

## 2020-07-28 DIAGNOSIS — R30.0 DYSURIA: Primary | ICD-10-CM

## 2020-07-28 DIAGNOSIS — Z71.89 COUNSELING REGARDING ADVANCED CARE PLANNING AND GOALS OF CARE: ICD-10-CM

## 2020-07-28 DIAGNOSIS — G91.2 NORMAL PRESSURE HYDROCEPHALUS (CMD): ICD-10-CM

## 2020-07-29 ENCOUNTER — LAB SERVICES (OUTPATIENT)
Dept: LAB | Age: 80
End: 2020-07-29

## 2020-07-29 ENCOUNTER — TELEPHONE (OUTPATIENT)
Dept: INTERNAL MEDICINE | Age: 80
End: 2020-07-29

## 2020-07-29 DIAGNOSIS — R30.0 DYSURIA: ICD-10-CM

## 2020-07-29 LAB
BACTERIA: ABNORMAL
BILIRUBIN URINE: NEGATIVE
BLOOD URINE: NEGATIVE
CLARITY: CLEAR
COLOR: YELLOW
GLUCOSE QUALITATIVE U: NEGATIVE
KETONES, URINE: NEGATIVE
LEUKOCYTE ESTERASE URINE: ABNORMAL
NITRITE URINE: POSITIVE
PH URINE: 7.5 (ref 5–7)
RED BLOOD CELLS URINE: ABNORMAL (ref 0–3)
SPECIFIC GRAVITY URINE: 1.01 (ref 1–1.03)
SQUAMOUS EPITHELIAL CELLS: ABNORMAL
URINE PROTEIN, QUAL (DIPSTICK): NEGATIVE
UROBILINOGEN URINE: 0.2
WHITE BLOOD CELLS URINE: ABNORMAL (ref 0–5)

## 2020-07-29 PROCEDURE — 81003 URINALYSIS AUTO W/O SCOPE: CPT | Performed by: INTERNAL MEDICINE

## 2020-07-29 PROCEDURE — 87088 URINE BACTERIA CULTURE: CPT | Performed by: INTERNAL MEDICINE

## 2020-07-29 PROCEDURE — 87186 SC STD MICRODIL/AGAR DIL: CPT | Performed by: INTERNAL MEDICINE

## 2020-07-29 PROCEDURE — 87086 URINE CULTURE/COLONY COUNT: CPT | Performed by: INTERNAL MEDICINE

## 2020-07-30 ENCOUNTER — TELEPHONE (OUTPATIENT)
Dept: INTERNAL MEDICINE | Age: 80
End: 2020-07-30

## 2020-07-30 ENCOUNTER — TELEPHONE (OUTPATIENT)
Dept: PALLIATIVE CARE | Age: 80
End: 2020-07-30

## 2020-07-30 DIAGNOSIS — N39.0 URINARY TRACT INFECTION WITHOUT HEMATURIA, SITE UNSPECIFIED: Primary | ICD-10-CM

## 2020-07-30 RX ORDER — NITROFURANTOIN 25; 75 MG/1; MG/1
100 CAPSULE ORAL 2 TIMES DAILY
Qty: 10 CAPSULE | Refills: 0 | Status: SHIPPED | OUTPATIENT
Start: 2020-07-30 | End: 2020-07-31 | Stop reason: ALTCHOICE

## 2020-07-31 ENCOUNTER — TELEPHONE (OUTPATIENT)
Dept: INTERNAL MEDICINE | Age: 80
End: 2020-07-31

## 2020-07-31 LAB — FINAL REPORT: ABNORMAL

## 2020-07-31 RX ORDER — LEVOFLOXACIN 500 MG/1
500 TABLET, FILM COATED ORAL DAILY
Qty: 5 TABLET | Refills: 0 | Status: SHIPPED | OUTPATIENT
Start: 2020-07-31 | End: 2020-11-04 | Stop reason: SDUPTHER

## 2020-08-06 ENCOUNTER — HOSPITAL ENCOUNTER (OUTPATIENT)
Dept: GENERAL RADIOLOGY | Facility: HOSPITAL | Age: 80
Discharge: HOME | End: 2020-08-06
Attending: ORTHOPAEDIC SURGERY
Payer: MEDICARE

## 2020-08-06 DIAGNOSIS — S72.001D CLOSED FRACTURE OF NECK OF RIGHT FEMUR WITH ROUTINE HEALING, SUBSEQUENT ENCOUNTER: ICD-10-CM

## 2020-08-06 PROCEDURE — 73502 X-RAY EXAM HIP UNI 2-3 VIEWS: CPT | Performed by: ORTHOPAEDIC SURGERY

## 2020-08-10 ENCOUNTER — OFFICE VISIT (OUTPATIENT)
Dept: ORTHOPEDICS CLINIC | Facility: CLINIC | Age: 80
End: 2020-08-10
Payer: COMMERCIAL

## 2020-08-10 VITALS — OXYGEN SATURATION: 96 % | HEART RATE: 92 BPM

## 2020-08-10 DIAGNOSIS — S72.001D CLOSED FRACTURE OF NECK OF RIGHT FEMUR WITH ROUTINE HEALING, SUBSEQUENT ENCOUNTER: Primary | ICD-10-CM

## 2020-08-10 PROCEDURE — 99024 POSTOP FOLLOW-UP VISIT: CPT | Performed by: ORTHOPAEDIC SURGERY

## 2020-08-10 NOTE — PROGRESS NOTES
[facility] Progress Note    Subjective: Patient returns 6 weeks postop from posterior right hip cemented hemiarthroplasty for femoral neck fracture. She is currently at home, has been discharged from rehab. She is working with home therapy.   She ta

## 2020-08-11 ENCOUNTER — TELEPHONE (OUTPATIENT)
Dept: ORTHOPEDICS CLINIC | Facility: CLINIC | Age: 80
End: 2020-08-11

## 2020-08-11 NOTE — TELEPHONE ENCOUNTER
Call received from [facility] she wanted to know when pt can start weaning off posterior hip precautions ph:[phone]      Please advise,

## 2020-08-12 NOTE — TELEPHONE ENCOUNTER
Please contact patient's home health (see message below for phone number for Sophie Manzano) - it is OK to wean posterior hip precautions now

## 2020-08-14 ENCOUNTER — V-VISIT (OUTPATIENT)
Dept: FAMILY MEDICINE | Age: 80
End: 2020-08-14

## 2020-08-14 ENCOUNTER — OFFICE VISIT (OUTPATIENT)
Dept: FAMILY MEDICINE | Age: 80
End: 2020-08-14

## 2020-08-14 DIAGNOSIS — F02.818 DEMENTIA ASSOCIATED WITH OTHER UNDERLYING DISEASE WITH BEHAVIORAL DISTURBANCE (CMD): Primary | ICD-10-CM

## 2020-08-14 DIAGNOSIS — R41.3 MEMORY LOSS: Primary | ICD-10-CM

## 2020-08-14 PROCEDURE — 99213 OFFICE O/P EST LOW 20 MIN: CPT | Performed by: INTERNAL MEDICINE

## 2020-08-22 ENCOUNTER — E-ADVICE (OUTPATIENT)
Dept: INTERNAL MEDICINE | Age: 80
End: 2020-08-22

## 2020-08-25 DIAGNOSIS — Z87.81 STATUS POST FRACTURE OF RIGHT HIP: ICD-10-CM

## 2020-08-25 DIAGNOSIS — S72.001D CLOSED FRACTURE OF NECK OF RIGHT FEMUR WITH ROUTINE HEALING: Primary | ICD-10-CM

## 2020-09-08 ENCOUNTER — TELEPHONE (OUTPATIENT)
Dept: PALLIATIVE CARE | Age: 80
End: 2020-09-08

## 2020-09-11 ENCOUNTER — TELEPHONE (OUTPATIENT)
Dept: PHYSICAL THERAPY | Age: 80
End: 2020-09-11

## 2020-09-11 ENCOUNTER — OFFICE VISIT (OUTPATIENT)
Dept: PHYSICAL THERAPY | Age: 80
End: 2020-09-11

## 2020-09-11 ENCOUNTER — LAB SERVICES (OUTPATIENT)
Dept: LAB | Age: 80
End: 2020-09-11

## 2020-09-11 DIAGNOSIS — S72.001A CLOSED FRACTURE OF NECK OF RIGHT FEMUR, INITIAL ENCOUNTER (CMD): Primary | ICD-10-CM

## 2020-09-11 DIAGNOSIS — S72.001A CLOSED FRACTURE OF RIGHT HIP, INITIAL ENCOUNTER (CMD): ICD-10-CM

## 2020-09-11 DIAGNOSIS — S72.001D CLOSED FRACTURE OF NECK OF RIGHT FEMUR WITH ROUTINE HEALING: ICD-10-CM

## 2020-09-11 DIAGNOSIS — Z96.641 STATUS POST RIGHT HIP REPLACEMENT: Primary | ICD-10-CM

## 2020-09-11 PROCEDURE — 97161 PT EVAL LOW COMPLEX 20 MIN: CPT | Performed by: PHYSICAL THERAPIST

## 2020-09-11 PROCEDURE — 97110 THERAPEUTIC EXERCISES: CPT | Performed by: PHYSICAL THERAPIST

## 2020-09-11 PROCEDURE — 97112 NEUROMUSCULAR REEDUCATION: CPT | Performed by: PHYSICAL THERAPIST

## 2020-09-11 ASSESSMENT — MOVEMENT AND STRENGTH ASSESSMENTS
GETTING INTO OR OUT OF A CAR: QUITE A BIT OF DIFFICULTY
PUTTING ON YOUR SHOES OR SOCKS: EXTREME DIFFICULTY OR UNABLE TO PERFORM ACTIVITY
ANY OF YOUR USUAL WORK, HOUSEWORK OR SCHOOL ACTIVITIES: EXTREME DIFFICULTY OR UNABLE TO PERFORM ACTIVITY
MAKING SHARP TURNS WHILE RUNNING FAST: EXTREME DIFFICULTY OR UNABLE TO PERFORM ACTIVITY
GOING UP OR DOWN 10 STAIRS (ABOUT 1 FLIGHT OF STAIRS): MODERATE DIFFICULTY
RUNNING ON UNEVEN GROUND: EXTREME DIFFICULTY OR UNABLE TO PERFORM ACTIVITY
ROLLING OVER IN BED: QUITE A BIT OF DIFFICULTY
LIFTING AN OBJECT, LIKE A BAG OF GROCERIES, FROM THE FLOOR: EXTREME DIFFICULTY OR UNABLE TO PERFORM ACTIVITY
STANDING FOR 1 HOUR: QUITE A BIT OF DIFFICULTY
YOUR USUAL HOBBIES, RECREATIONAL OR SPORTING ACTIVIITIES: EXTREME DIFFICULTY OR UNABLE TO PERFORM ACTIVITY
PERFORMING HEAVY ACTIVITIES AROUND YOUR HOME: EXTREME DIFFICULTY OR UNABLE TO PERFORM ACTIVITY
WALKING 2 BLOCKS: QUITE A BIT OF DIFFICULTY
WALKING A MILE: EXTREME DIFFICULTY OR UNABLE TO PERFORM ACTIVITY
WALKING BETWEEN ROOMS: MODERATE DIFFICULTY
PERFORMING LIGHT ACTIVITES AROUND YOUR HOME: EXTREME DIFFICULTY OR UNABLE TO PERFORM ACTIVITY
HOPPING: EXTREME DIFFICULTY OR UNABLE TO PERFORM ACTIVITY
SQUATTING: MODERATE DIFFICULTY
RUNNING ON EVEN GROUND: EXTREME DIFFICULTY OR UNABLE TO PERFORM ACTIVITY
TOTAL SCORE: 20
GETTING INTO OR OUT OF THE BATH: MODERATE DIFFICULTY
SITTING FOR 1 HOUR: NO DIFFICULTY

## 2020-09-11 ASSESSMENT — BALANCE ASSESSMENTS
BALANCE SCORE: 7
EYES CLOSED AT MAXIMUM POSITION NUDGED: UNSTEADY
ARISES: ABLE, USES ARMS TO HELP
IMMEDIATE STANDING BALANCE FIRST 5 SECONDS: STEADY BUT USES WALKER OR OTHER SUPPORT
SITTING BALANCE: STEADY, SAFE
SITTING DOWN: USES ARMS OR NOT A SMOOTH MOTION
STANDING BALANCE: STEADY BUT WIDE STANCE (MEDIAL HEELS > 4 INCHES APART) AND USES CANE OR OTHER SUPPORT
TURNING 360 DEGREES PART 2: UNSTEADY (GRABS, SWAGGERS)
ATTEMPTS TO ARISE: ABLE TO RISE, 1 ATTEMPT
NUDGED: BEGINS TO FALL
TURNING 360 DEGREES PART 1: DISCONTINUOUS STEPS

## 2020-09-11 ASSESSMENT — GAIT ASSESSMENTS
STEP LENGTH AND HEIGHT PART 1: RIGHT SWING FOOT DOES NOT PASS LEFT STANCE FOOT WITH STEP
TUG TIME: WHEELED WALKER
WALKING STANCE: HEELS ALMOST TOUCHING WHILE WALKING
INITIATION OF GAIT IMMEDIATELY AFTER GO: ANY HESITANCY OR MULTIPE ATTEMPTS TO START
STEP LENGTH AND HEIGHT PART 3: LEFT SWING FOOT DOES NOT PASS RIGHT STANCE FOOT WITH STEP
STEP CONTINUITY: STOPPING OR DISCONTINUITY BETWEEN STEPS
PATH: MILD/MODERATE DEVIATION OR USES WALKING AID
TUG TURNS: STABLE ON TURNS
TUG TIME: 75
BALANCE AND GAIT SCORE: 9
STEP LENGTH AND HEIGHT PART 2: RIGHT FOOT DOES NOT CLEAR FLOOR COMPLETELY WITH STEP
GAIT SCORE: 2
TRUNK: MARKED SWAY OR USES WALKING AID
STEP SYMMETRY: RIGHT AND LEFT STEP LENGTH NOT EQUAL (ESTIMATE)
TUG TIME: GAIT BELT USED
STEP LENGTH AND HEIGHT PART 4: LEFT FOOT DOES NOT CLEAR FLOOR COMPLETELY WITH STEP

## 2020-09-11 ASSESSMENT — ENCOUNTER SYMPTOMS
PAIN FREQUENCY: INTERMITTENT
ALLEVIATING FACTORS: REST
PAIN LOCATION: RIGHT HIP AND BACK

## 2020-09-14 ENCOUNTER — OFFICE VISIT (OUTPATIENT)
Dept: PHYSICAL THERAPY | Age: 80
End: 2020-09-14

## 2020-09-14 DIAGNOSIS — Z96.641 STATUS POST RIGHT HIP REPLACEMENT: Primary | ICD-10-CM

## 2020-09-14 DIAGNOSIS — S72.001D CLOSED FRACTURE OF NECK OF RIGHT FEMUR WITH ROUTINE HEALING: ICD-10-CM

## 2020-09-14 PROCEDURE — 97112 NEUROMUSCULAR REEDUCATION: CPT | Performed by: PHYSICAL THERAPIST

## 2020-09-14 PROCEDURE — 97110 THERAPEUTIC EXERCISES: CPT | Performed by: PHYSICAL THERAPIST

## 2020-09-16 ENCOUNTER — LAB SERVICES (OUTPATIENT)
Dept: LAB | Age: 80
End: 2020-09-16

## 2020-09-16 ENCOUNTER — BEHAVIORAL HEALTH (OUTPATIENT)
Dept: BEHAVIORAL HEALTH | Age: 80
End: 2020-09-16

## 2020-09-16 DIAGNOSIS — G91.2 NORMAL PRESSURE HYDROCEPHALUS (CMD): ICD-10-CM

## 2020-09-16 DIAGNOSIS — F03.90 MAJOR NEUROCOGNITIVE DISORDER (CMD): Primary | ICD-10-CM

## 2020-09-16 DIAGNOSIS — F03.90 DEMENTIA WITHOUT BEHAVIORAL DISTURBANCE, UNSPECIFIED DEMENTIA TYPE: ICD-10-CM

## 2020-09-16 DIAGNOSIS — S72.001A CLOSED FRACTURE OF RIGHT HIP, INITIAL ENCOUNTER (CMD): ICD-10-CM

## 2020-09-16 PROCEDURE — 99214 OFFICE O/P EST MOD 30 MIN: CPT | Performed by: PSYCHIATRY & NEUROLOGY

## 2020-09-16 PROCEDURE — 36415 COLL VENOUS BLD VENIPUNCTURE: CPT | Performed by: PATHOLOGY

## 2020-09-16 PROCEDURE — 82306 VITAMIN D 25 HYDROXY: CPT | Performed by: PATHOLOGY

## 2020-09-16 RX ORDER — LEVOTHYROXINE SODIUM 0.05 MG/1
TABLET ORAL
Qty: 90 TABLET | Refills: 0 | Status: SHIPPED | OUTPATIENT
Start: 2020-09-16 | End: 2020-12-02

## 2020-09-16 RX ORDER — BUPROPION HYDROCHLORIDE 75 MG/1
75 TABLET ORAL
Qty: 30 TABLET | Refills: 2 | Status: SHIPPED | OUTPATIENT
Start: 2020-09-16 | End: 2020-12-07

## 2020-09-16 RX ORDER — RISPERIDONE 0.25 MG/1
0.25 TABLET ORAL 2 TIMES DAILY
Qty: 60 TABLET | Refills: 3 | Status: SHIPPED | OUTPATIENT
Start: 2020-09-16 | End: 2020-12-15 | Stop reason: SDUPTHER

## 2020-09-16 RX ORDER — VENLAFAXINE HYDROCHLORIDE 75 MG/1
75 CAPSULE, EXTENDED RELEASE ORAL DAILY
Qty: 30 CAPSULE | Refills: 3 | Status: SHIPPED | OUTPATIENT
Start: 2020-09-16 | End: 2020-12-15 | Stop reason: SDUPTHER

## 2020-09-17 ENCOUNTER — OFFICE VISIT (OUTPATIENT)
Dept: PHYSICAL THERAPY | Age: 80
End: 2020-09-17

## 2020-09-17 DIAGNOSIS — S72.001D CLOSED FRACTURE OF NECK OF RIGHT FEMUR WITH ROUTINE HEALING: ICD-10-CM

## 2020-09-17 DIAGNOSIS — Z96.641 STATUS POST RIGHT HIP REPLACEMENT: Primary | ICD-10-CM

## 2020-09-17 LAB
25(OH)D3 SERPL-MCNC: 26.6 NG/ML (ref 30–100)
CA-I ADJ PH7.4 SERPL-SCNC: 1.33 MMOL/L (ref 1.15–1.29)
CA-I SERPL-SCNC: 1.37 MMOL/L (ref 1.15–1.29)

## 2020-09-17 PROCEDURE — 97110 THERAPEUTIC EXERCISES: CPT | Performed by: PHYSICAL THERAPIST

## 2020-09-17 PROCEDURE — 97112 NEUROMUSCULAR REEDUCATION: CPT | Performed by: PHYSICAL THERAPIST

## 2020-09-17 RX ORDER — RISPERIDONE 0.25 MG/1
TABLET ORAL
Qty: 60 TABLET | Refills: 0 | OUTPATIENT
Start: 2020-09-17

## 2020-09-18 ENCOUNTER — OFFICE VISIT (OUTPATIENT)
Dept: PHYSICAL THERAPY | Age: 80
End: 2020-09-18

## 2020-09-18 ENCOUNTER — TELEPHONE (OUTPATIENT)
Dept: PHYSICAL THERAPY | Age: 80
End: 2020-09-18

## 2020-09-18 DIAGNOSIS — Z96.641 STATUS POST RIGHT HIP REPLACEMENT: Primary | ICD-10-CM

## 2020-09-18 DIAGNOSIS — S72.001D CLOSED FRACTURE OF NECK OF RIGHT FEMUR WITH ROUTINE HEALING: ICD-10-CM

## 2020-09-18 PROCEDURE — 97112 NEUROMUSCULAR REEDUCATION: CPT | Performed by: PHYSICAL THERAPIST

## 2020-09-18 PROCEDURE — 97110 THERAPEUTIC EXERCISES: CPT | Performed by: PHYSICAL THERAPIST

## 2020-09-20 DIAGNOSIS — E78.2 MIXED HYPERLIPIDEMIA: ICD-10-CM

## 2020-09-20 RX ORDER — LOVASTATIN 10 MG/1
TABLET ORAL
Qty: 90 TABLET | Refills: 0 | Status: SHIPPED | OUTPATIENT
Start: 2020-09-20 | End: 2021-03-30

## 2020-09-20 RX ORDER — LOVASTATIN 20 MG/1
TABLET ORAL
Qty: 90 TABLET | Refills: 0 | Status: SHIPPED | OUTPATIENT
Start: 2020-09-20 | End: 2021-03-30

## 2020-09-21 ENCOUNTER — OFFICE VISIT (OUTPATIENT)
Dept: ORTHOPEDICS CLINIC | Facility: CLINIC | Age: 80
End: 2020-09-21
Payer: COMMERCIAL

## 2020-09-21 VITALS — OXYGEN SATURATION: 98 % | HEART RATE: 75 BPM

## 2020-09-21 DIAGNOSIS — S72.001D CLOSED FRACTURE OF NECK OF RIGHT FEMUR WITH ROUTINE HEALING, SUBSEQUENT ENCOUNTER: Primary | ICD-10-CM

## 2020-09-21 LAB — FAX RESULTS: NORMAL

## 2020-09-21 PROCEDURE — 99024 POSTOP FOLLOW-UP VISIT: CPT | Performed by: ORTHOPAEDIC SURGERY

## 2020-09-21 RX ORDER — RISPERIDONE 0.25 MG/1
0.25 TABLET, FILM COATED ORAL 2 TIMES DAILY
COMMUNITY
Start: 2020-09-16

## 2020-09-21 RX ORDER — BUPROPION HYDROCHLORIDE 75 MG/1
TABLET ORAL
COMMUNITY
Start: 2020-09-16

## 2020-09-21 NOTE — PROGRESS NOTES
[facility] Progress Note    Subjective: Patient returns to clinic 3 months postop from posterior right hip cemented hemiarthroplasty for femoral neck fracture. She is at home, working with outpatient therapy.   Per discussion with patient's  a

## 2020-09-25 ENCOUNTER — OFFICE VISIT (OUTPATIENT)
Dept: PHYSICAL THERAPY | Age: 80
End: 2020-09-25

## 2020-09-25 DIAGNOSIS — S72.001D CLOSED FRACTURE OF NECK OF RIGHT FEMUR WITH ROUTINE HEALING: ICD-10-CM

## 2020-09-25 DIAGNOSIS — Z96.641 STATUS POST RIGHT HIP REPLACEMENT: Primary | ICD-10-CM

## 2020-09-25 PROCEDURE — 97110 THERAPEUTIC EXERCISES: CPT | Performed by: PHYSICAL THERAPIST

## 2020-09-25 PROCEDURE — 97140 MANUAL THERAPY 1/> REGIONS: CPT | Performed by: PHYSICAL THERAPIST

## 2020-09-25 PROCEDURE — 97112 NEUROMUSCULAR REEDUCATION: CPT | Performed by: PHYSICAL THERAPIST

## 2020-09-29 ENCOUNTER — OFFICE VISIT (OUTPATIENT)
Dept: PHYSICAL THERAPY | Age: 80
End: 2020-09-29

## 2020-09-29 DIAGNOSIS — S72.001D CLOSED FRACTURE OF NECK OF RIGHT FEMUR WITH ROUTINE HEALING: ICD-10-CM

## 2020-09-29 DIAGNOSIS — Z96.641 STATUS POST RIGHT HIP REPLACEMENT: Primary | ICD-10-CM

## 2020-09-29 PROCEDURE — 97110 THERAPEUTIC EXERCISES: CPT | Performed by: PHYSICAL THERAPIST

## 2020-09-29 PROCEDURE — 97112 NEUROMUSCULAR REEDUCATION: CPT | Performed by: PHYSICAL THERAPIST

## 2020-09-29 PROCEDURE — 97140 MANUAL THERAPY 1/> REGIONS: CPT | Performed by: PHYSICAL THERAPIST

## 2020-09-30 ENCOUNTER — TELEPHONE (OUTPATIENT)
Dept: PHYSICAL THERAPY | Age: 80
End: 2020-09-30

## 2020-10-02 ENCOUNTER — OFFICE VISIT (OUTPATIENT)
Dept: PHYSICAL THERAPY | Age: 80
End: 2020-10-02

## 2020-10-02 ENCOUNTER — MED INFO FORMS (OUTPATIENT)
Dept: HEALTH INFORMATION MANAGEMENT | Facility: OTHER | Age: 80
End: 2020-10-02

## 2020-10-02 DIAGNOSIS — S72.001D CLOSED FRACTURE OF NECK OF RIGHT FEMUR WITH ROUTINE HEALING: Primary | ICD-10-CM

## 2020-10-02 DIAGNOSIS — Z96.641 STATUS POST RIGHT HIP REPLACEMENT: ICD-10-CM

## 2020-10-02 PROCEDURE — 97140 MANUAL THERAPY 1/> REGIONS: CPT | Performed by: PHYSICAL THERAPIST

## 2020-10-02 PROCEDURE — 97110 THERAPEUTIC EXERCISES: CPT | Performed by: PHYSICAL THERAPIST

## 2020-10-02 PROCEDURE — 97112 NEUROMUSCULAR REEDUCATION: CPT | Performed by: PHYSICAL THERAPIST

## 2020-10-05 ENCOUNTER — MED INFO FORMS (OUTPATIENT)
Dept: INTERNAL MEDICINE | Age: 80
End: 2020-10-05

## 2020-10-05 ENCOUNTER — OFFICE VISIT (OUTPATIENT)
Dept: PHYSICAL THERAPY | Age: 80
End: 2020-10-05

## 2020-10-05 DIAGNOSIS — Z96.641 STATUS POST RIGHT HIP REPLACEMENT: ICD-10-CM

## 2020-10-05 DIAGNOSIS — S72.001D CLOSED FRACTURE OF NECK OF RIGHT FEMUR WITH ROUTINE HEALING: Primary | ICD-10-CM

## 2020-10-05 PROCEDURE — 97110 THERAPEUTIC EXERCISES: CPT | Performed by: PHYSICAL THERAPIST

## 2020-10-05 PROCEDURE — 97140 MANUAL THERAPY 1/> REGIONS: CPT | Performed by: PHYSICAL THERAPIST

## 2020-10-05 PROCEDURE — 97112 NEUROMUSCULAR REEDUCATION: CPT | Performed by: PHYSICAL THERAPIST

## 2020-10-07 ENCOUNTER — OFFICE VISIT (OUTPATIENT)
Dept: PHYSICAL THERAPY | Age: 80
End: 2020-10-07

## 2020-10-07 DIAGNOSIS — S72.001D CLOSED FRACTURE OF NECK OF RIGHT FEMUR WITH ROUTINE HEALING: Primary | ICD-10-CM

## 2020-10-07 DIAGNOSIS — Z96.641 STATUS POST RIGHT HIP REPLACEMENT: ICD-10-CM

## 2020-10-07 PROCEDURE — 97140 MANUAL THERAPY 1/> REGIONS: CPT | Performed by: PHYSICAL THERAPIST

## 2020-10-07 PROCEDURE — 97112 NEUROMUSCULAR REEDUCATION: CPT | Performed by: PHYSICAL THERAPIST

## 2020-10-07 PROCEDURE — 97110 THERAPEUTIC EXERCISES: CPT | Performed by: PHYSICAL THERAPIST

## 2020-10-12 RX ORDER — LEVOTHYROXINE SODIUM 0.05 MG/1
TABLET ORAL
Qty: 30 TABLET | Refills: 0 | OUTPATIENT
Start: 2020-10-12

## 2020-10-12 NOTE — TELEPHONE ENCOUNTER
[facility] Visit date not found    LAST LAB    LAST RX    Next OV   Future Appointments   Date Time Provider Pablito Barr   6/21/2021  3:00 PM Geri Wilcox MD [facility]         PROTOCOL    Name from pharmacy: Levothyroxine Sodium Oral Table

## 2020-10-13 ENCOUNTER — OFFICE VISIT (OUTPATIENT)
Dept: PHYSICAL THERAPY | Age: 80
End: 2020-10-13

## 2020-10-13 ENCOUNTER — TELEPHONE (OUTPATIENT)
Dept: PHYSICAL THERAPY | Age: 80
End: 2020-10-13

## 2020-10-13 DIAGNOSIS — Z96.641 STATUS POST RIGHT HIP REPLACEMENT: ICD-10-CM

## 2020-10-13 DIAGNOSIS — S72.001D CLOSED FRACTURE OF NECK OF RIGHT FEMUR WITH ROUTINE HEALING: Primary | ICD-10-CM

## 2020-10-13 PROCEDURE — 97110 THERAPEUTIC EXERCISES: CPT | Performed by: PHYSICAL THERAPIST

## 2020-10-13 PROCEDURE — 97140 MANUAL THERAPY 1/> REGIONS: CPT | Performed by: PHYSICAL THERAPIST

## 2020-10-13 PROCEDURE — 97112 NEUROMUSCULAR REEDUCATION: CPT | Performed by: PHYSICAL THERAPIST

## 2020-10-15 ENCOUNTER — OFFICE VISIT (OUTPATIENT)
Dept: PHYSICAL THERAPY | Age: 80
End: 2020-10-15

## 2020-10-15 DIAGNOSIS — S72.001D CLOSED FRACTURE OF NECK OF RIGHT FEMUR WITH ROUTINE HEALING: Primary | ICD-10-CM

## 2020-10-15 DIAGNOSIS — Z96.641 STATUS POST RIGHT HIP REPLACEMENT: ICD-10-CM

## 2020-10-15 PROCEDURE — 97112 NEUROMUSCULAR REEDUCATION: CPT | Performed by: PHYSICAL THERAPIST

## 2020-10-15 PROCEDURE — 97110 THERAPEUTIC EXERCISES: CPT | Performed by: PHYSICAL THERAPIST

## 2020-10-16 ENCOUNTER — TELEPHONE (OUTPATIENT)
Dept: PHYSICAL THERAPY | Age: 80
End: 2020-10-16

## 2020-10-19 ENCOUNTER — TELEPHONE (OUTPATIENT)
Dept: BEHAVIORAL HEALTH | Age: 80
End: 2020-10-19

## 2020-10-19 ENCOUNTER — OFFICE VISIT (OUTPATIENT)
Dept: PHYSICAL THERAPY | Age: 80
End: 2020-10-19

## 2020-10-19 DIAGNOSIS — Z96.641 STATUS POST RIGHT HIP REPLACEMENT: ICD-10-CM

## 2020-10-19 DIAGNOSIS — S72.001D CLOSED FRACTURE OF NECK OF RIGHT FEMUR WITH ROUTINE HEALING: Primary | ICD-10-CM

## 2020-10-19 PROCEDURE — 97112 NEUROMUSCULAR REEDUCATION: CPT | Performed by: PHYSICAL THERAPIST

## 2020-10-19 PROCEDURE — 97110 THERAPEUTIC EXERCISES: CPT | Performed by: PHYSICAL THERAPIST

## 2020-10-21 ENCOUNTER — OFFICE VISIT (OUTPATIENT)
Dept: PHYSICAL THERAPY | Age: 80
End: 2020-10-21

## 2020-10-21 DIAGNOSIS — S72.001D CLOSED FRACTURE OF NECK OF RIGHT FEMUR WITH ROUTINE HEALING: Primary | ICD-10-CM

## 2020-10-21 DIAGNOSIS — Z96.641 STATUS POST RIGHT HIP REPLACEMENT: ICD-10-CM

## 2020-10-21 PROCEDURE — 97140 MANUAL THERAPY 1/> REGIONS: CPT | Performed by: PHYSICAL THERAPIST

## 2020-10-21 PROCEDURE — 97110 THERAPEUTIC EXERCISES: CPT | Performed by: PHYSICAL THERAPIST

## 2020-10-21 PROCEDURE — 97112 NEUROMUSCULAR REEDUCATION: CPT | Performed by: PHYSICAL THERAPIST

## 2020-10-29 ENCOUNTER — EXTERNAL RECORD (OUTPATIENT)
Dept: HEALTH INFORMATION MANAGEMENT | Facility: OTHER | Age: 80
End: 2020-10-29

## 2020-10-29 ENCOUNTER — TELEPHONE (OUTPATIENT)
Dept: PHYSICAL THERAPY | Age: 80
End: 2020-10-29

## 2020-10-29 ENCOUNTER — IMMUNIZATION (OUTPATIENT)
Dept: INTERNAL MEDICINE | Age: 80
End: 2020-10-29

## 2020-10-29 ENCOUNTER — OFFICE VISIT (OUTPATIENT)
Dept: PHYSICAL THERAPY | Age: 80
End: 2020-10-29

## 2020-10-29 DIAGNOSIS — S72.001D CLOSED FRACTURE OF NECK OF RIGHT FEMUR WITH ROUTINE HEALING: Primary | ICD-10-CM

## 2020-10-29 DIAGNOSIS — Z23 NEED FOR INFLUENZA VACCINATION: Primary | ICD-10-CM

## 2020-10-29 DIAGNOSIS — Z96.641 STATUS POST RIGHT HIP REPLACEMENT: ICD-10-CM

## 2020-10-29 PROCEDURE — 97110 THERAPEUTIC EXERCISES: CPT | Performed by: PHYSICAL THERAPIST

## 2020-10-29 PROCEDURE — 97140 MANUAL THERAPY 1/> REGIONS: CPT | Performed by: PHYSICAL THERAPIST

## 2020-10-29 PROCEDURE — 90686 IIV4 VACC NO PRSV 0.5 ML IM: CPT

## 2020-10-29 PROCEDURE — 97112 NEUROMUSCULAR REEDUCATION: CPT | Performed by: PHYSICAL THERAPIST

## 2020-10-29 PROCEDURE — G0008 ADMIN INFLUENZA VIRUS VAC: HCPCS

## 2020-10-29 ASSESSMENT — GAIT ASSESSMENTS
STEP SYMMETRY: RIGHT AND LEFT STEP APPEAR EQUAL
TUG TIME: 95
STEP LENGTH AND HEIGHT PART 1: RIGHT SWING FOOT DOES NOT PASS LEFT STANCE FOOT WITH STEP
TUG TIME: STANDARD WALKER
STEP LENGTH AND HEIGHT PART 4: LEFT FOOT COMPLETELY CLEARS FLOOR
TRUNK: MARKED SWAY OR USES WALKING AID
TUG TURNS: STABLE ON TURNS
STEP CONTINUITY: STOPPING OR DISCONTINUITY BETWEEN STEPS
STEP LENGTH AND HEIGHT PART 2: RIGHT FOOT COMPLETELY CLEARS FLOOR
PATH: MILD/MODERATE DEVIATION OR USES WALKING AID
GAIT SCORE: 5
STEP LENGTH AND HEIGHT PART 3: LEFT SWING FOOT DOES NOT PASS RIGHT STANCE FOOT WITH STEP
BALANCE AND GAIT SCORE: 12
WALKING STANCE: HEELS ALMOST TOUCHING WHILE WALKING
INITIATION OF GAIT IMMEDIATELY AFTER GO: ANY HESITANCY OR MULTIPE ATTEMPTS TO START

## 2020-10-29 ASSESSMENT — BALANCE ASSESSMENTS
NUDGED: STAGGERS, GRABS, CATCHES SELF
STANDING BALANCE: STEADY BUT WIDE STANCE (MEDIAL HEELS > 4 INCHES APART) AND USES CANE OR OTHER SUPPORT
SITTING DOWN: USES ARMS OR NOT A SMOOTH MOTION
TURNING 360 DEGREES PART 1: DISCONTINUOUS STEPS
TURNING 360 DEGREES PART 2: UNSTEADY (GRABS, SWAGGERS)
BALANCE SCORE: 7
ARISES: ABLE, USES ARMS TO HELP
IMMEDIATE STANDING BALANCE FIRST 5 SECONDS: STEADY BUT USES WALKER OR OTHER SUPPORT
ATTEMPTS TO ARISE: ABLE, REQUIRES >1 ATTEMPT
EYES CLOSED AT MAXIMUM POSITION NUDGED: UNSTEADY
SITTING BALANCE: STEADY, SAFE

## 2020-10-29 ASSESSMENT — MOVEMENT AND STRENGTH ASSESSMENTS
TOTAL SCORE: 12.5
PUTTING ON YOUR SHOES OR SOCKS: EXTREME DIFFICULTY OR UNABLE TO PERFORM ACTIVITY
MAKING SHARP TURNS WHILE RUNNING FAST: EXTREME DIFFICULTY OR UNABLE TO PERFORM ACTIVITY
LIFTING AN OBJECT, LIKE A BAG OF GROCERIES, FROM THE FLOOR: EXTREME DIFFICULTY OR UNABLE TO PERFORM ACTIVITY
WALKING 2 BLOCKS: EXTREME DIFFICULTY OR UNABLE TO PERFORM ACTIVITY
GOING UP OR DOWN 10 STAIRS (ABOUT 1 FLIGHT OF STAIRS): QUITE A BIT OF DIFFICULTY
GETTING INTO OR OUT OF THE BATH: QUITE A BIT OF DIFFICULTY
SQUATTING: QUITE A BIT OF DIFFICULTY
PERFORMING LIGHT ACTIVITES AROUND YOUR HOME: EXTREME DIFFICULTY OR UNABLE TO PERFORM ACTIVITY
WALKING A MILE: EXTREME DIFFICULTY OR UNABLE TO PERFORM ACTIVITY
WALKING BETWEEN ROOMS: QUITE A BIT OF DIFFICULTY
PERFORMING HEAVY ACTIVITIES AROUND YOUR HOME: EXTREME DIFFICULTY OR UNABLE TO PERFORM ACTIVITY
GETTING INTO OR OUT OF A CAR: QUITE A BIT OF DIFFICULTY
HOPPING: EXTREME DIFFICULTY OR UNABLE TO PERFORM ACTIVITY
ROLLING OVER IN BED: QUITE A BIT OF DIFFICULTY
RUNNING ON EVEN GROUND: EXTREME DIFFICULTY OR UNABLE TO PERFORM ACTIVITY
SITTING FOR 1 HOUR: A LITTLE BIT OF DIFFICULTY
STANDING FOR 1 HOUR: QUITE A BIT OF DIFFICULTY
RUNNING ON UNEVEN GROUND: EXTREME DIFFICULTY OR UNABLE TO PERFORM ACTIVITY
ANY OF YOUR USUAL WORK, HOUSEWORK OR SCHOOL ACTIVITIES: EXTREME DIFFICULTY OR UNABLE TO PERFORM ACTIVITY
YOUR USUAL HOBBIES, RECREATIONAL OR SPORTING ACTIVIITIES: EXTREME DIFFICULTY OR UNABLE TO PERFORM ACTIVITY

## 2020-11-03 ENCOUNTER — LAB SERVICES (OUTPATIENT)
Dept: LAB | Age: 80
End: 2020-11-03

## 2020-11-03 ENCOUNTER — TELEPHONE (OUTPATIENT)
Dept: INTERNAL MEDICINE | Age: 80
End: 2020-11-03

## 2020-11-03 DIAGNOSIS — R82.90 ABNORMAL URINE ODOR: Primary | ICD-10-CM

## 2020-11-03 DIAGNOSIS — N39.0 URINARY TRACT INFECTION WITHOUT HEMATURIA, SITE UNSPECIFIED: ICD-10-CM

## 2020-11-03 DIAGNOSIS — R82.90 ABNORMAL URINE ODOR: ICD-10-CM

## 2020-11-03 LAB
BACTERIA: ABNORMAL
BILIRUBIN URINE: NEGATIVE
BLOOD URINE: ABNORMAL
CLARITY: CLEAR
COLOR: YELLOW
GLUCOSE QUALITATIVE U: NEGATIVE
KETONES, URINE: ABNORMAL
LEUKOCYTE ESTERASE URINE: ABNORMAL
NITRITE URINE: POSITIVE
PH URINE: 6.5 (ref 5–7)
RED BLOOD CELLS URINE: ABNORMAL (ref 0–3)
SPECIFIC GRAVITY URINE: 1.02 (ref 1–1.03)
SQUAMOUS EPITHELIAL CELLS: ABNORMAL
URINE PROTEIN, QUAL (DIPSTICK): NEGATIVE
UROBILINOGEN URINE: 0.2
WHITE BLOOD CELLS URINE: ABNORMAL (ref 0–5)

## 2020-11-03 PROCEDURE — 81003 URINALYSIS AUTO W/O SCOPE: CPT | Performed by: INTERNAL MEDICINE

## 2020-11-03 PROCEDURE — 87186 SC STD MICRODIL/AGAR DIL: CPT | Performed by: INTERNAL MEDICINE

## 2020-11-03 PROCEDURE — 87086 URINE CULTURE/COLONY COUNT: CPT | Performed by: INTERNAL MEDICINE

## 2020-11-03 PROCEDURE — 87088 URINE BACTERIA CULTURE: CPT | Performed by: INTERNAL MEDICINE

## 2020-11-04 RX ORDER — LEVOFLOXACIN 500 MG/1
500 TABLET, FILM COATED ORAL DAILY
Qty: 5 TABLET | Refills: 0 | Status: SHIPPED | OUTPATIENT
Start: 2020-11-04 | End: 2020-11-09

## 2020-11-05 LAB — FINAL REPORT: ABNORMAL

## 2020-11-16 ENCOUNTER — V-VISIT (OUTPATIENT)
Dept: FAMILY MEDICINE | Age: 80
End: 2020-11-16

## 2020-11-16 ENCOUNTER — TELEPHONE (OUTPATIENT)
Dept: FAMILY MEDICINE | Age: 80
End: 2020-11-16

## 2020-11-16 DIAGNOSIS — R41.3 MEMORY IMPAIRMENT: Primary | ICD-10-CM

## 2020-11-16 DIAGNOSIS — F02.818 DEMENTIA ASSOCIATED WITH OTHER UNDERLYING DISEASE WITH BEHAVIORAL DISTURBANCE (CMD): Primary | ICD-10-CM

## 2020-11-16 PROCEDURE — X1094 NO CHARGE VISIT: HCPCS | Performed by: INTERNAL MEDICINE

## 2020-11-16 PROCEDURE — 99213 OFFICE O/P EST LOW 20 MIN: CPT | Performed by: INTERNAL MEDICINE

## 2020-11-20 ENCOUNTER — E-ADVICE (OUTPATIENT)
Dept: INTERNAL MEDICINE | Age: 80
End: 2020-11-20

## 2020-11-24 ENCOUNTER — OFFICE VISIT (OUTPATIENT)
Dept: INTERNAL MEDICINE | Age: 80
End: 2020-11-24

## 2020-11-24 ENCOUNTER — TELEPHONE (OUTPATIENT)
Dept: INTERNAL MEDICINE | Age: 80
End: 2020-11-24

## 2020-11-24 ENCOUNTER — APPOINTMENT (OUTPATIENT)
Dept: INTERNAL MEDICINE | Age: 80
End: 2020-11-24

## 2020-11-24 DIAGNOSIS — R30.0 DYSURIA: ICD-10-CM

## 2020-11-24 DIAGNOSIS — I10 ESSENTIAL HYPERTENSION: Primary | ICD-10-CM

## 2020-11-24 DIAGNOSIS — E78.2 MIXED HYPERLIPIDEMIA: ICD-10-CM

## 2020-11-24 DIAGNOSIS — F02.818 DEMENTIA ASSOCIATED WITH OTHER UNDERLYING DISEASE WITH BEHAVIORAL DISTURBANCE (CMD): ICD-10-CM

## 2020-11-24 DIAGNOSIS — E03.9 HYPOTHYROIDISM, UNSPECIFIED TYPE: ICD-10-CM

## 2020-11-24 DIAGNOSIS — Z11.1 SCREENING EXAMINATION FOR PULMONARY TUBERCULOSIS: ICD-10-CM

## 2020-11-24 PROCEDURE — 96127 BRIEF EMOTIONAL/BEHAV ASSMT: CPT | Performed by: INTERNAL MEDICINE

## 2020-11-24 PROCEDURE — 3078F DIAST BP <80 MM HG: CPT | Performed by: INTERNAL MEDICINE

## 2020-11-24 PROCEDURE — 99214 OFFICE O/P EST MOD 30 MIN: CPT | Performed by: INTERNAL MEDICINE

## 2020-11-24 PROCEDURE — 3074F SYST BP LT 130 MM HG: CPT | Performed by: INTERNAL MEDICINE

## 2020-11-24 RX ORDER — METHYLPHENIDATE HYDROCHLORIDE 10 MG/1
10 TABLET ORAL DAILY
COMMUNITY
End: 2020-12-15 | Stop reason: ALTCHOICE

## 2020-11-24 ASSESSMENT — PATIENT HEALTH QUESTIONNAIRE - PHQ9
6. FEELING BAD ABOUT YOURSELF - OR THAT YOU ARE A FAILURE OR HAVE LET YOURSELF OR YOUR FAMILY DOWN: NOT AT ALL
CLINICAL INTERPRETATION OF PHQ2 SCORE: FURTHER SCREENING NEEDED
9. THOUGHTS THAT YOU WOULD BE BETTER OFF DEAD, OR OF HURTING YOURSELF: NOT AT ALL
1. LITTLE INTEREST OR PLEASURE IN DOING THINGS: MORE THAN HALF THE DAYS
7. TROUBLE CONCENTRATING ON THINGS, SUCH AS READING THE NEWSPAPER OR WATCHING TELEVISION: MORE THAN HALF THE DAYS
2. FEELING DOWN, DEPRESSED OR HOPELESS: NEARLY EVERY DAY
8. MOVING OR SPEAKING SO SLOWLY THAT OTHER PEOPLE COULD HAVE NOTICED. OR THE OPPOSITE, BEING SO FIGETY OR RESTLESS THAT YOU HAVE BEEN MOVING AROUND A LOT MORE THAN USUAL: NOT AT ALL
5. POOR APPETITE, WEIGHT LOSS, OR OVEREATING: NOT AT ALL
3. TROUBLE FALLING OR STAYING ASLEEP OR SLEEPING TOO MUCH: NOT AT ALL
CLINICAL INTERPRETATION OF PHQ9 SCORE: FURTHER SCREENING NEEDED
4. FEELING TIRED OR HAVING LITTLE ENERGY: NEARLY EVERY DAY
SUM OF ALL RESPONSES TO PHQ9 QUESTIONS 1 AND 2: 5
SUM OF ALL RESPONSES TO PHQ9 QUESTIONS 1 AND 2: 5

## 2020-11-24 ASSESSMENT — PAIN SCALES - GENERAL: PAINLEVEL: 0

## 2020-12-02 ENCOUNTER — LAB SERVICES (OUTPATIENT)
Dept: LAB | Age: 80
End: 2020-12-02

## 2020-12-02 DIAGNOSIS — E78.2 MIXED HYPERLIPIDEMIA: ICD-10-CM

## 2020-12-02 DIAGNOSIS — Z11.1 SCREENING EXAMINATION FOR PULMONARY TUBERCULOSIS: ICD-10-CM

## 2020-12-02 DIAGNOSIS — R30.0 DYSURIA: ICD-10-CM

## 2020-12-02 DIAGNOSIS — I10 ESSENTIAL HYPERTENSION: ICD-10-CM

## 2020-12-02 DIAGNOSIS — E03.9 HYPOTHYROIDISM, UNSPECIFIED TYPE: ICD-10-CM

## 2020-12-02 LAB
ALBUMIN SERPL-MCNC: 3.7 G/DL (ref 3.6–5.1)
ALP SERPL-CCNC: 50 U/L (ref 45–130)
ALT SERPL W/O P-5'-P-CCNC: 13 U/L (ref 4–38)
AST SERPL-CCNC: 19 U/L (ref 14–43)
BILIRUB SERPL-MCNC: 0.3 MG/DL (ref 0–1.3)
BUN SERPL-MCNC: 25 MG/DL (ref 7–20)
CALCIUM SERPL-MCNC: 9.8 MG/DL (ref 8.6–10.6)
CHLORIDE SERPL-SCNC: 102 MMOL/L (ref 96–107)
CHOLEST SERPL-MCNC: 140 MG/DL (ref 140–200)
CO2 SERPL-SCNC: 31 MMOL/L (ref 22–32)
CREAT SERPL-MCNC: 0.9 MG/DL (ref 0.5–1.4)
GFR SERPL CREATININE-BSD FRML MDRD: >60 ML/MIN/{1.73M2}
GFR SERPL CREATININE-BSD FRML MDRD: >60 ML/MIN/{1.73M2}
GLUCOSE P FAST SERPL-MCNC: 89 MG/DL (ref 60–100)
HDLC SERPL-MCNC: 46 MG/DL
LDLC SERPL CALC-MCNC: 69 MG/DL (ref 30–100)
POTASSIUM SERPL-SCNC: 4 MMOL/L (ref 3.5–5.3)
PROT SERPL-MCNC: 6.6 G/DL (ref 6.4–8.5)
SODIUM SERPL-SCNC: 136 MMOL/L (ref 136–146)
TRIGL SERPL-MCNC: 125 MG/DL (ref 0–200)
TSH SERPL DL<=0.05 MIU/L-ACNC: 0.72 M[IU]/L (ref 0.3–4.82)

## 2020-12-02 PROCEDURE — 81003 URINALYSIS AUTO W/O SCOPE: CPT | Performed by: INTERNAL MEDICINE

## 2020-12-02 PROCEDURE — 80061 LIPID PANEL: CPT | Performed by: INTERNAL MEDICINE

## 2020-12-02 PROCEDURE — 80053 COMPREHEN METABOLIC PANEL: CPT | Performed by: INTERNAL MEDICINE

## 2020-12-02 PROCEDURE — 84443 ASSAY THYROID STIM HORMONE: CPT | Performed by: INTERNAL MEDICINE

## 2020-12-02 PROCEDURE — 36415 COLL VENOUS BLD VENIPUNCTURE: CPT | Performed by: INTERNAL MEDICINE

## 2020-12-02 RX ORDER — LEVOTHYROXINE SODIUM 0.05 MG/1
TABLET ORAL
Qty: 30 TABLET | Refills: 0 | Status: SHIPPED | OUTPATIENT
Start: 2020-12-02 | End: 2020-12-04 | Stop reason: SDUPTHER

## 2020-12-02 RX ORDER — LEVOTHYROXINE SODIUM 0.05 MG/1
50 TABLET ORAL DAILY
Qty: 90 TABLET | Refills: 0 | Status: CANCELLED | OUTPATIENT
Start: 2020-12-02

## 2020-12-03 LAB
BILIRUBIN URINE: NEGATIVE
BLOOD URINE: NEGATIVE
CLARITY: NORMAL
COLOR: YELLOW
GLUCOSE QUALITATIVE U: NEGATIVE
KETONES, URINE: NEGATIVE
LEUKOCYTE ESTERASE URINE: NEGATIVE
NITRITE URINE: NEGATIVE
PH URINE: 6 (ref 5–7)
SPECIFIC GRAVITY URINE: 1.02 (ref 1–1.03)
URINE PROTEIN, QUAL (DIPSTICK): NEGATIVE
UROBILINOGEN URINE: <2

## 2020-12-04 LAB
GAMMA INTERFERON BACKGROUND BLD IA-ACNC: 0.09 IU/ML
M TB IFN-G BLD-IMP: NEGATIVE
M TB IFN-G CD4+ BCKGRND COR BLD-ACNC: 0.01 IU/ML
M TB IFN-G CD4+CD8+ BCKGRND COR BLD-ACNC: 0.02 IU/ML
MITOGEN IGNF BCKGRD COR BLD-ACNC: >10 IU/ML

## 2020-12-04 RX ORDER — LEVOTHYROXINE SODIUM 0.05 MG/1
50 TABLET ORAL DAILY
Qty: 90 TABLET | Refills: 1 | Status: SHIPPED | OUTPATIENT
Start: 2020-12-04 | End: 2021-06-18

## 2020-12-07 RX ORDER — BUPROPION HYDROCHLORIDE 75 MG/1
TABLET ORAL
Qty: 30 TABLET | Refills: 0 | Status: SHIPPED | OUTPATIENT
Start: 2020-12-07 | End: 2020-12-15 | Stop reason: DRUGHIGH

## 2020-12-08 ENCOUNTER — TELEPHONE (OUTPATIENT)
Dept: INTERNAL MEDICINE | Age: 80
End: 2020-12-08

## 2020-12-08 DIAGNOSIS — M79.673 PAIN OF FOOT, UNSPECIFIED LATERALITY: Primary | ICD-10-CM

## 2020-12-09 ENCOUNTER — TELEPHONE (OUTPATIENT)
Dept: INTERNAL MEDICINE | Age: 80
End: 2020-12-09

## 2020-12-15 ENCOUNTER — BEHAVIORAL HEALTH (OUTPATIENT)
Dept: BEHAVIORAL HEALTH | Age: 80
End: 2020-12-15

## 2020-12-15 DIAGNOSIS — F34.1 PERSISTENT DEPRESSIVE DISORDER: ICD-10-CM

## 2020-12-15 DIAGNOSIS — F03.90 MAJOR NEUROCOGNITIVE DISORDER (CMD): Primary | ICD-10-CM

## 2020-12-15 PROCEDURE — 99214 OFFICE O/P EST MOD 30 MIN: CPT | Performed by: PSYCHIATRY & NEUROLOGY

## 2020-12-15 RX ORDER — RISPERIDONE 0.25 MG/1
0.25 TABLET ORAL 2 TIMES DAILY
Qty: 60 TABLET | Refills: 3 | Status: SHIPPED | OUTPATIENT
Start: 2020-12-15 | End: 2021-04-13 | Stop reason: SDUPTHER

## 2020-12-15 RX ORDER — MODAFINIL 100 MG/1
100 TABLET ORAL DAILY
Qty: 30 TABLET | Refills: 0 | Status: SHIPPED | OUTPATIENT
Start: 2020-12-15 | End: 2021-01-28

## 2020-12-15 RX ORDER — VENLAFAXINE HYDROCHLORIDE 75 MG/1
75 CAPSULE, EXTENDED RELEASE ORAL DAILY
Qty: 30 CAPSULE | Refills: 3 | Status: SHIPPED | OUTPATIENT
Start: 2020-12-15 | End: 2021-04-13 | Stop reason: SDUPTHER

## 2020-12-15 RX ORDER — BUPROPION HYDROCHLORIDE 150 MG/1
150 TABLET ORAL DAILY
Qty: 30 TABLET | Refills: 3 | Status: SHIPPED | OUTPATIENT
Start: 2020-12-15 | End: 2021-04-13 | Stop reason: SDUPTHER

## 2020-12-22 ENCOUNTER — MED INFO FORMS (OUTPATIENT)
Dept: BEHAVIORAL HEALTH | Age: 80
End: 2020-12-22

## 2020-12-29 ENCOUNTER — OFFICE VISIT (OUTPATIENT)
Dept: PODIATRY | Age: 80
End: 2020-12-29
Attending: INTERNAL MEDICINE

## 2020-12-29 DIAGNOSIS — M79.674 PAIN IN TOES OF BOTH FEET: ICD-10-CM

## 2020-12-29 DIAGNOSIS — M79.675 PAIN IN TOES OF BOTH FEET: ICD-10-CM

## 2020-12-29 DIAGNOSIS — B35.1 DERMATOPHYTOSIS OF NAIL: Primary | ICD-10-CM

## 2020-12-29 PROCEDURE — 99203 OFFICE O/P NEW LOW 30 MIN: CPT | Performed by: PODIATRIST

## 2020-12-29 SDOH — HEALTH STABILITY: MENTAL HEALTH: HOW OFTEN DO YOU HAVE A DRINK CONTAINING ALCOHOL?: NEVER

## 2021-01-04 RX ORDER — BUPROPION HYDROCHLORIDE 75 MG/1
TABLET ORAL
Qty: 30 TABLET | Refills: 0 | OUTPATIENT
Start: 2021-01-04

## 2021-01-28 RX ORDER — MODAFINIL 100 MG/1
TABLET ORAL
Qty: 30 TABLET | Refills: 0 | Status: SHIPPED | OUTPATIENT
Start: 2021-01-28 | End: 2021-03-22

## 2021-02-03 ENCOUNTER — TELEPHONE (OUTPATIENT)
Dept: INTERNAL MEDICINE | Age: 81
End: 2021-02-03

## 2021-02-03 DIAGNOSIS — R39.89 ABNORMAL URINE COLOR: ICD-10-CM

## 2021-02-03 DIAGNOSIS — R35.0 URINE FREQUENCY: Primary | ICD-10-CM

## 2021-02-04 ENCOUNTER — LAB SERVICES (OUTPATIENT)
Dept: LAB | Age: 81
End: 2021-02-04

## 2021-02-04 DIAGNOSIS — R35.0 URINE FREQUENCY: ICD-10-CM

## 2021-02-04 DIAGNOSIS — R39.89 ABNORMAL URINE COLOR: ICD-10-CM

## 2021-02-04 LAB
BACTERIA: ABNORMAL
BILIRUBIN URINE: NEGATIVE
BLOOD URINE: NEGATIVE
CLARITY: ABNORMAL
COLOR: YELLOW
GLUCOSE QUALITATIVE U: NEGATIVE
KETONES, URINE: NEGATIVE
LEUKOCYTE ESTERASE URINE: ABNORMAL
MUCOUS: ABNORMAL
NITRITE URINE: NEGATIVE
PH URINE: 6 (ref 5–7)
RED BLOOD CELLS URINE: 0 (ref 0–3)
SPECIFIC GRAVITY URINE: 1.01 (ref 1–1.03)
SQUAMOUS EPITHELIAL CELLS: ABNORMAL
URINE PROTEIN, QUAL (DIPSTICK): NEGATIVE
UROBILINOGEN URINE: <2
WHITE BLOOD CELLS URINE: ABNORMAL (ref 0–5)

## 2021-02-04 PROCEDURE — 87086 URINE CULTURE/COLONY COUNT: CPT | Performed by: INTERNAL MEDICINE

## 2021-02-04 PROCEDURE — 81003 URINALYSIS AUTO W/O SCOPE: CPT | Performed by: INTERNAL MEDICINE

## 2021-02-06 LAB — FINAL REPORT: NORMAL

## 2021-02-08 RX ORDER — LEVOFLOXACIN 500 MG/1
500 TABLET, FILM COATED ORAL DAILY
Qty: 5 TABLET | Refills: 0 | Status: SHIPPED | OUTPATIENT
Start: 2021-02-08 | End: 2021-02-13

## 2021-02-16 ENCOUNTER — TELEPHONE (OUTPATIENT)
Dept: INTERNAL MEDICINE | Age: 81
End: 2021-02-16

## 2021-02-19 ENCOUNTER — TELEPHONE (OUTPATIENT)
Dept: ORTHOPEDICS CLINIC | Facility: CLINIC | Age: 81
End: 2021-02-19

## 2021-02-19 NOTE — TELEPHONE ENCOUNTER
Patient has an appointment with Dr Courtney Douglas on Monday, 2/22/21 for right hip pain that goes down thru her leg. She had that hip replaced on 6/25/2020 by Dr Courtney Douglas. Please send order if xrays needed.

## 2021-02-22 ENCOUNTER — OFFICE VISIT (OUTPATIENT)
Dept: ORTHOPEDICS CLINIC | Facility: CLINIC | Age: 81
End: 2021-02-22
Payer: COMMERCIAL

## 2021-02-22 ENCOUNTER — HOSPITAL ENCOUNTER (OUTPATIENT)
Dept: GENERAL RADIOLOGY | Age: 81
Discharge: HOME | End: 2021-02-22
Attending: ORTHOPAEDIC SURGERY
Payer: MEDICARE

## 2021-02-22 VITALS — HEART RATE: 84 BPM | OXYGEN SATURATION: 97 %

## 2021-02-22 DIAGNOSIS — M79.604 ACUTE PAIN OF RIGHT LOWER EXTREMITY: Primary | ICD-10-CM

## 2021-02-22 DIAGNOSIS — S72.001D CLOSED FRACTURE OF NECK OF RIGHT FEMUR WITH ROUTINE HEALING, SUBSEQUENT ENCOUNTER: ICD-10-CM

## 2021-02-22 PROCEDURE — 73502 X-RAY EXAM HIP UNI 2-3 VIEWS: CPT | Performed by: ORTHOPAEDIC SURGERY

## 2021-02-22 PROCEDURE — 99213 OFFICE O/P EST LOW 20 MIN: CPT | Performed by: ORTHOPAEDIC SURGERY

## 2021-02-22 RX ORDER — MODAFINIL 100 MG/1
TABLET ORAL
COMMUNITY
Start: 2021-01-28

## 2021-02-22 NOTE — PROGRESS NOTES
[facility] Progress Note    Subjective: Patient returns to clinic 8 months after cemented right hip hemiarthroplasty for displaced hip fracture. Patient is here with her  today as well as caregiver.   States that she was doing very well, rehab getting better with therapy, but she has stopped this recently. They are looking to move into assisted living within the next month where there will be therapy available.   My recommendation was to restart physical therapy for the hip and back as well as g

## 2021-03-22 ENCOUNTER — TELEPHONE (OUTPATIENT)
Dept: INTERNAL MEDICINE | Age: 81
End: 2021-03-22

## 2021-03-22 ENCOUNTER — TELEPHONE (OUTPATIENT)
Dept: ORTHOPEDICS CLINIC | Facility: CLINIC | Age: 81
End: 2021-03-22

## 2021-03-22 DIAGNOSIS — S72.001D CLOSED FRACTURE OF NECK OF RIGHT FEMUR WITH ROUTINE HEALING, SUBSEQUENT ENCOUNTER: Primary | ICD-10-CM

## 2021-03-22 RX ORDER — MODAFINIL 100 MG/1
TABLET ORAL
Qty: 30 TABLET | Refills: 0 | Status: SHIPPED | OUTPATIENT
Start: 2021-03-22 | End: 2021-04-20 | Stop reason: SDUPTHER

## 2021-03-23 ENCOUNTER — TELEPHONE (OUTPATIENT)
Dept: BEHAVIORAL HEALTH | Age: 81
End: 2021-03-23

## 2021-03-24 DIAGNOSIS — E78.2 MIXED HYPERLIPIDEMIA: ICD-10-CM

## 2021-03-30 RX ORDER — LOVASTATIN 20 MG/1
TABLET ORAL
Qty: 90 TABLET | Refills: 0 | Status: SHIPPED | OUTPATIENT
Start: 2021-03-30 | End: 2021-06-18

## 2021-03-30 RX ORDER — LOVASTATIN 10 MG/1
TABLET ORAL
Qty: 90 TABLET | Refills: 0 | Status: SHIPPED | OUTPATIENT
Start: 2021-03-30 | End: 2021-06-29

## 2021-04-01 ENCOUNTER — MED REC SCAN ONLY (OUTPATIENT)
Dept: ORTHOPEDICS CLINIC | Facility: CLINIC | Age: 81
End: 2021-04-01

## 2021-04-12 ENCOUNTER — TELEPHONE (OUTPATIENT)
Dept: ORTHOPEDICS CLINIC | Facility: CLINIC | Age: 81
End: 2021-04-12

## 2021-04-13 ENCOUNTER — BEHAVIORAL HEALTH (OUTPATIENT)
Dept: BEHAVIORAL HEALTH | Age: 81
End: 2021-04-13

## 2021-04-13 DIAGNOSIS — F03.90 MAJOR NEUROCOGNITIVE DISORDER (CMD): Primary | ICD-10-CM

## 2021-04-13 DIAGNOSIS — F34.1 PERSISTENT DEPRESSIVE DISORDER: ICD-10-CM

## 2021-04-13 PROCEDURE — 99214 OFFICE O/P EST MOD 30 MIN: CPT | Performed by: PSYCHIATRY & NEUROLOGY

## 2021-04-13 RX ORDER — BUPROPION HYDROCHLORIDE 150 MG/1
150 TABLET ORAL DAILY
Qty: 30 TABLET | Refills: 5 | Status: SHIPPED | OUTPATIENT
Start: 2021-04-13

## 2021-04-13 RX ORDER — RISPERIDONE 0.25 MG/1
0.25 TABLET ORAL 2 TIMES DAILY
Qty: 60 TABLET | Refills: 5 | Status: SHIPPED | OUTPATIENT
Start: 2021-04-13 | End: 2021-04-13 | Stop reason: SDUPTHER

## 2021-04-13 RX ORDER — VENLAFAXINE HYDROCHLORIDE 75 MG/1
75 CAPSULE, EXTENDED RELEASE ORAL DAILY
Qty: 30 CAPSULE | Refills: 5 | Status: SHIPPED | OUTPATIENT
Start: 2021-04-13

## 2021-04-13 RX ORDER — BUPROPION HYDROCHLORIDE 150 MG/1
150 TABLET ORAL DAILY
Qty: 30 TABLET | Refills: 5 | Status: SHIPPED | OUTPATIENT
Start: 2021-04-13 | End: 2021-04-13 | Stop reason: SDUPTHER

## 2021-04-13 RX ORDER — RISPERIDONE 0.25 MG/1
0.25 TABLET ORAL 2 TIMES DAILY
Qty: 60 TABLET | Refills: 5 | Status: SHIPPED | OUTPATIENT
Start: 2021-04-13

## 2021-04-20 RX ORDER — MODAFINIL 100 MG/1
100 TABLET ORAL DAILY
Qty: 30 TABLET | Refills: 0 | Status: SHIPPED | OUTPATIENT
Start: 2021-04-20 | End: 2021-06-11

## 2021-05-18 ENCOUNTER — V-VISIT (OUTPATIENT)
Dept: FAMILY MEDICINE | Age: 81
End: 2021-05-18

## 2021-05-18 DIAGNOSIS — F02.818 DEMENTIA ASSOCIATED WITH OTHER UNDERLYING DISEASE WITH BEHAVIORAL DISTURBANCE (CMD): Primary | ICD-10-CM

## 2021-05-18 DIAGNOSIS — R41.3 MEMORY IMPAIRMENT: Primary | ICD-10-CM

## 2021-05-18 PROCEDURE — 99214 OFFICE O/P EST MOD 30 MIN: CPT | Performed by: INTERNAL MEDICINE

## 2021-05-18 PROCEDURE — X1094 NO CHARGE VISIT: HCPCS

## 2021-05-18 PROCEDURE — 96127 BRIEF EMOTIONAL/BEHAV ASSMT: CPT | Performed by: INTERNAL MEDICINE

## 2021-05-18 RX ORDER — MEMANTINE HYDROCHLORIDE 5 MG/1
5 TABLET ORAL 2 TIMES DAILY
Qty: 60 TABLET | Refills: 5 | Status: SHIPPED | OUTPATIENT
Start: 2021-05-18

## 2021-05-18 ASSESSMENT — ANXIETY QUESTIONNAIRES
5. BEING SO RESTLESS THAT IT IS HARD TO SIT STILL: SEVERAL DAYS
1. FEELING NERVOUS, ANXIOUS, OR ON EDGE: NOT AT ALL
6. BECOMING EASILY ANNOYED OR IRRITABLE: 1
1. FEELING NERVOUS, ANXIOUS, OR ON EDGE: 0
7. FEELING AFRAID AS IF SOMETHING AWFUL MIGHT HAPPEN: 0
2. NOT BEING ABLE TO STOP OR CONTROL WORRYING: 0
7. FEELING AFRAID AS IF SOMETHING AWFUL MIGHT HAPPEN: NOT AT ALL
2. NOT BEING ABLE TO STOP OR CONTROL WORRYING: NOT AT ALL
IF YOU CHECKED OFF ANY PROBLEMS ON THIS QUESTIONNAIRE, HOW DIFFICULT HAVE THESE PROBLEMS MADE IT FOR YOU TO DO YOUR WORK, TAKE CARE OF THINGS AT HOME, OR GET ALONG WITH OTHER PEOPLE: NOT DIFFICULT AT ALL
3. WORRYING TOO MUCH ABOUT DIFFERENT THINGS: NOT AT ALL
5. BEING SO RESTLESS THAT IT IS HARD TO SIT STILL: 1
4. TROUBLE RELAXING: 0
3. WORRYING TOO MUCH ABOUT DIFFERENT THINGS: 0
4. TROUBLE RELAXING: NOT AT ALL
6. BECOMING EASILY ANNOYED OR IRRITABLE: SEVERAL DAYS
GAD7 TOTAL SCORE: 2

## 2021-05-18 ASSESSMENT — PATIENT HEALTH QUESTIONNAIRE - PHQ9
CLINICAL INTERPRETATION OF PHQ9 SCORE: NO FURTHER SCREENING NEEDED
CLINICAL INTERPRETATION OF PHQ2 SCORE: NO FURTHER SCREENING NEEDED
SUM OF ALL RESPONSES TO PHQ9 QUESTIONS 1 AND 2: 1
SUM OF ALL RESPONSES TO PHQ9 QUESTIONS 1 AND 2: 1
1. LITTLE INTEREST OR PLEASURE IN DOING THINGS: NOT AT ALL
2. FEELING DOWN, DEPRESSED OR HOPELESS: SEVERAL DAYS

## 2021-05-25 VITALS
WEIGHT: 127 LBS | DIASTOLIC BLOOD PRESSURE: 74 MMHG | BODY MASS INDEX: 23.98 KG/M2 | HEART RATE: 77 BPM | SYSTOLIC BLOOD PRESSURE: 118 MMHG | HEIGHT: 61 IN | RESPIRATION RATE: 18 BRPM | TEMPERATURE: 95.8 F | OXYGEN SATURATION: 96 %

## 2021-06-11 RX ORDER — MODAFINIL 100 MG/1
TABLET ORAL
Qty: 30 TABLET | Refills: 0 | Status: SHIPPED | OUTPATIENT
Start: 2021-06-11 | End: 2021-07-13

## 2021-06-17 DIAGNOSIS — E78.2 MIXED HYPERLIPIDEMIA: ICD-10-CM

## 2021-06-18 RX ORDER — LOVASTATIN 20 MG/1
TABLET ORAL
Qty: 90 TABLET | Refills: 0 | Status: SHIPPED | OUTPATIENT
Start: 2021-06-18 | End: 2021-09-13

## 2021-06-18 RX ORDER — LEVOTHYROXINE SODIUM 0.05 MG/1
TABLET ORAL
Qty: 90 TABLET | Refills: 0 | Status: SHIPPED | OUTPATIENT
Start: 2021-06-18 | End: 2021-07-03 | Stop reason: SDUPTHER

## 2021-06-21 ENCOUNTER — TELEPHONE (OUTPATIENT)
Dept: ORTHOPEDICS CLINIC | Facility: CLINIC | Age: 81
End: 2021-06-21

## 2021-06-21 DIAGNOSIS — S72.001D CLOSED FRACTURE OF NECK OF RIGHT FEMUR WITH ROUTINE HEALING, SUBSEQUENT ENCOUNTER: Primary | ICD-10-CM

## 2021-06-21 NOTE — TELEPHONE ENCOUNTER
· Spoke to spouse regarding pt's missed appt.    · Spouse states they were not aware of the upcoming appt  · Spouse doent feel like she needs this appt and would like to speak to a nurse  · Spouse states it is hard to get the pt in and out of the car  · Ple

## 2021-06-21 NOTE — TELEPHONE ENCOUNTER
Attempted to reach patients , no answer, LM to call back to discuss follow up appt. Patient is due for 1 yr follow up from date of surgery. LOV note:2/22/21  Assessment/Plan: 72-year-old female with right lower extremity and right low back pain.

## 2021-06-22 NOTE — TELEPHONE ENCOUNTER
2nd attempt to reach patients . No answer, left message to contact the office.   Patients mychart is active but does not check messages

## 2021-06-27 DIAGNOSIS — E03.9 HYPOTHYROIDISM, UNSPECIFIED TYPE: ICD-10-CM

## 2021-06-27 DIAGNOSIS — E78.2 MIXED HYPERLIPIDEMIA: ICD-10-CM

## 2021-06-27 DIAGNOSIS — I10 ESSENTIAL HYPERTENSION: Primary | ICD-10-CM

## 2021-06-29 RX ORDER — LOVASTATIN 20 MG/1
TABLET ORAL
Qty: 90 TABLET | Refills: 0 | OUTPATIENT
Start: 2021-06-29

## 2021-06-29 RX ORDER — LOVASTATIN 10 MG/1
TABLET ORAL
Qty: 90 TABLET | Refills: 0 | Status: SHIPPED | OUTPATIENT
Start: 2021-06-29 | End: 2021-07-03 | Stop reason: SDUPTHER

## 2021-07-02 ENCOUNTER — TELEPHONE (OUTPATIENT)
Dept: INTERNAL MEDICINE | Age: 81
End: 2021-07-02

## 2021-07-02 DIAGNOSIS — E78.2 MIXED HYPERLIPIDEMIA: ICD-10-CM

## 2021-07-03 RX ORDER — LOVASTATIN 10 MG/1
10 TABLET ORAL
Qty: 90 TABLET | Refills: 0 | Status: SHIPPED | OUTPATIENT
Start: 2021-07-03 | End: 2021-09-28

## 2021-07-03 RX ORDER — LEVOTHYROXINE SODIUM 0.05 MG/1
50 TABLET ORAL DAILY
Qty: 90 TABLET | Refills: 0 | Status: SHIPPED | OUTPATIENT
Start: 2021-07-03

## 2021-07-07 ENCOUNTER — TELEPHONE (OUTPATIENT)
Dept: INTERNAL MEDICINE | Age: 81
End: 2021-07-07

## 2021-07-07 ENCOUNTER — LAB SERVICES (OUTPATIENT)
Dept: LAB | Age: 81
End: 2021-07-07

## 2021-07-07 DIAGNOSIS — I10 ESSENTIAL HYPERTENSION: ICD-10-CM

## 2021-07-07 DIAGNOSIS — E78.2 MIXED HYPERLIPIDEMIA: ICD-10-CM

## 2021-07-07 DIAGNOSIS — E03.9 HYPOTHYROIDISM, UNSPECIFIED TYPE: ICD-10-CM

## 2021-07-07 DIAGNOSIS — E78.2 MIXED HYPERLIPIDEMIA: Primary | ICD-10-CM

## 2021-07-07 LAB
ALBUMIN SERPL-MCNC: 4 G/DL (ref 3.6–5.1)
ALP SERPL-CCNC: 58 U/L (ref 45–130)
ALT SERPL W/O P-5'-P-CCNC: 11 U/L (ref 4–38)
AST SERPL-CCNC: 18 U/L (ref 14–43)
BILIRUB SERPL-MCNC: 0.3 MG/DL (ref 0–1.3)
BUN SERPL-MCNC: 28 MG/DL (ref 7–20)
CALCIUM SERPL-MCNC: 10 MG/DL (ref 8.6–10.6)
CHLORIDE SERPL-SCNC: 104 MMOL/L (ref 96–107)
CHOLEST SERPL-MCNC: 145 MG/DL (ref 140–200)
CO2 SERPL-SCNC: 29 MMOL/L (ref 22–32)
CREAT SERPL-MCNC: 0.8 MG/DL (ref 0.5–1.4)
GFR SERPL CREATININE-BSD FRML MDRD: >60 ML/MIN/{1.73M2}
GFR SERPL CREATININE-BSD FRML MDRD: >60 ML/MIN/{1.73M2}
GLUCOSE P FAST SERPL-MCNC: 79 MG/DL (ref 60–100)
HDLC SERPL-MCNC: 47 MG/DL
LDLC SERPL CALC-MCNC: 57 MG/DL (ref 30–100)
POTASSIUM SERPL-SCNC: 4.7 MMOL/L (ref 3.5–5.3)
PROT SERPL-MCNC: 7.1 G/DL (ref 6.4–8.5)
SODIUM SERPL-SCNC: 140 MMOL/L (ref 136–146)
TRIGL SERPL-MCNC: 207 MG/DL (ref 0–200)
TSH SERPL DL<=0.05 MIU/L-ACNC: 1.08 M[IU]/L (ref 0.3–4.82)

## 2021-07-07 PROCEDURE — 36415 COLL VENOUS BLD VENIPUNCTURE: CPT | Performed by: INTERNAL MEDICINE

## 2021-07-07 PROCEDURE — 84443 ASSAY THYROID STIM HORMONE: CPT | Performed by: INTERNAL MEDICINE

## 2021-07-07 PROCEDURE — 80053 COMPREHEN METABOLIC PANEL: CPT | Performed by: INTERNAL MEDICINE

## 2021-07-07 PROCEDURE — 80061 LIPID PANEL: CPT | Performed by: INTERNAL MEDICINE

## 2021-07-09 ENCOUNTER — TELEPHONE (OUTPATIENT)
Dept: INTERNAL MEDICINE | Age: 81
End: 2021-07-09

## 2021-07-13 RX ORDER — MODAFINIL 100 MG/1
TABLET ORAL
Qty: 30 TABLET | Refills: 0 | Status: SHIPPED | OUTPATIENT
Start: 2021-07-13

## 2021-07-21 ENCOUNTER — TELEPHONE (OUTPATIENT)
Dept: INTERNAL MEDICINE | Age: 81
End: 2021-07-21

## 2021-09-13 DIAGNOSIS — E78.2 MIXED HYPERLIPIDEMIA: ICD-10-CM

## 2021-09-13 RX ORDER — LOVASTATIN 20 MG/1
TABLET ORAL
Qty: 90 TABLET | Refills: 0 | Status: SHIPPED | OUTPATIENT
Start: 2021-09-13

## 2021-09-28 DIAGNOSIS — E78.2 MIXED HYPERLIPIDEMIA: ICD-10-CM

## 2021-09-28 RX ORDER — LOVASTATIN 10 MG/1
TABLET ORAL
Qty: 90 TABLET | Refills: 0 | Status: SHIPPED | OUTPATIENT
Start: 2021-09-28

## 2021-11-08 ENCOUNTER — TELEPHONE (OUTPATIENT)
Dept: BEHAVIORAL HEALTH | Age: 81
End: 2021-11-08

## 2021-11-08 ENCOUNTER — APPOINTMENT (OUTPATIENT)
Dept: BEHAVIORAL HEALTH | Age: 81
End: 2021-11-08

## (undated) DEVICE — HOOD, PEEL-AWAY: Brand: FLYTE

## (undated) DEVICE — KENDALL SCD EXPRESS SLEEVES, KNEE LENGTH, MEDIUM: Brand: KENDALL SCD

## (undated) DEVICE — INTENDED TO AID IN THE PASSING OF SUTURES THROUGH BONE AND SOFT TISSUE DURING ORTHOPEDIC SURGERY: Brand: HOFFEE SUTURE RETRIEVER

## (undated) DEVICE — STERILE HOOK LOCK LATEX FREE ELASTIC BANDAGE 6INX5YD: Brand: HOOK LOCK™

## (undated) DEVICE — SOL  .9 3000ML

## (undated) DEVICE — DRAIN RELIAVAC W/DRN MED 1/8

## (undated) DEVICE — SUTURE MONOCRYL 4-0 PS-2

## (undated) DEVICE — BLADE ELECTRODE: Brand: EDGE

## (undated) DEVICE — DRAPE,U/SHT,SPLIT,FILM,60X84,STERILE: Brand: MEDLINE

## (undated) DEVICE — HIGH CAPACITY INTRAMEDULLARY BRUSH TIP: Brand: PULSAVAC®

## (undated) DEVICE — Device

## (undated) DEVICE — NEEDLE SPINAL 18X3-1/2 PINK.

## (undated) DEVICE — TOTAL HIP CDS: Brand: MEDLINE INDUSTRIES, INC.

## (undated) DEVICE — MLPD DISPOSABLE PAD (6' ROLL) 3 ROLLS: Brand: SCHAERER MEDICAL USA

## (undated) DEVICE — STERILE POLYISOPRENE POWDER-FREE SURGICAL GLOVES WITH EMOLLIENT COATING: Brand: PROTEXIS

## (undated) DEVICE — SPECIMEN CONTAINER,POSITIVE SEAL INDICATOR, OR PACKAGED: Brand: PRECISION

## (undated) DEVICE — PREMIUM WET SKIN PREP TRAY: Brand: MEDLINE INDUSTRIES, INC.

## (undated) DEVICE — UNIVERSAL STERIBUMP® STERILE (5/CASE): Brand: UNIVERSAL STERIBUMP®

## (undated) DEVICE — 3M™ STERI-STRIP™ REINFORCED ADHESIVE SKIN CLOSURES, R1548, 1 IN X 5 IN (25 MM X 125 MM), 4 STRIPS/ENVELOPE: Brand: 3M™ STERI-STRIP™

## (undated) DEVICE — SUTURE ETHIBOND 2 V-37

## (undated) DEVICE — SOL  .9 1000ML BTL

## (undated) DEVICE — SUTURE VICRYL 2-0 CP-1

## (undated) DEVICE — CONVERTORS STOCKINETTE: Brand: CONVERTORS

## (undated) DEVICE — GOWN SURG AERO CHROME XXL

## (undated) DEVICE — 3M™ TEGADERM™ TRANSPARENT FILM DRESSING, 1626W, 4 IN X 4-3/4 IN (10 CM X 12 CM), 50 EACH/CARTON, 4 CARTON/CASE: Brand: 3M™ TEGADERM™

## (undated) DEVICE — THROAT PACKING X-RAY DETECT

## (undated) DEVICE — STERILE POLYISOPRENE POWDER-FREE SURGICAL GLOVES: Brand: PROTEXIS

## (undated) DEVICE — PILLOW ABDUCTION HIP SM

## (undated) DEVICE — DERMABOND LIQUID ADHESIVE

## (undated) DEVICE — SUTURE TICRON 5 3027-79

## (undated) DEVICE — REM POLYHESIVE ADULT PATIENT RETURN ELECTRODE: Brand: VALLEYLAB

## (undated) DEVICE — DRESSING AQUACEL AG 3.5 X 10

## (undated) DEVICE — Device: Brand: STABLECUT®

## (undated) DEVICE — 3M™ IOBAN™ 2 ANTIMICROBIAL INCISE DRAPE 6651EZ: Brand: IOBAN™ 2

## (undated) DEVICE — ALCOHOL 70% 4 OZ

## (undated) NOTE — IP AVS SNAPSHOT
[address]            (For Outpatient Use Only) Initial Admit Date: 6/24/2020   Inpt/Obs Admit Date: Inpt: 6/25/20 / Obs: N/A   Discharge Date:    Georgina Chris:  [de-identified]   MRN: [de-identified]   CSN: [id number]   CEID: [id number] Subscriber Name:  Liberty Liu :    Subscriber ID:  Pt Rel to Subscriber:    Hospital Account Financial Class: Medicare Advantage    2020

## (undated) NOTE — IP AVS SNAPSHOT
[address]            (For Outpatient Use Only) Initial Admit Date: 1/19/2020   Inpt/Obs Admit Date: Inpt: N/A / Obs: 01/19/20   Discharge Date:    Douglas Diop:  [de-identified]   MRN: [de-identified]   CSN: [id number]   CEID: [id number] January 22, 2020

## (undated) NOTE — LETTER
Millicent Rodriguez [address]  [address] [facility] [address], [address] [facility]  Authorization for Surgical Operation and Procedure     Date:___________                                                                                                         Time:__________ potential risks that can occur: fever and allergic reactions, hemolytic reactions, transmission of diseases such as Hepatitis, AIDS and Cytomegalovirus (CMV) and fluid overload.   In the event that I wish to have an autologous transfusion of my own blood, o attending physician will determine when the applicable recovery period ends for purposes of reinstating the DNAR order.   10. Patients having a sterilization procedure: I understand that if the procedure is successful the results will be permanent and it wi on my reliance that the physicians providing care and treatment to me are employees or agents of the Hospital.  2. As the patient asking for anesthesia services, I agree to:  a.  Allow the anesthesiologist/ CRNA to give me medicine and do additional procedu include itching, low blood pressure, difficulty urinating, headache or slowing of baby’s heart. Vary rare risks include infection, bleeding, seizure, irregular heart rhythms and nerve injury.    7. Regional Anesthesia (“spinal” “epidural” & “nerve blocks”)

## (undated) NOTE — LETTER
Millicent Rodriguez [address]  [address] [facility] [address], [address] [facility]  Authorization for Surgical Operation and Procedure     Date:___________                                                                                                         Time:__________ potential risks that can occur: fever and allergic reactions, hemolytic reactions, transmission of diseases such as Hepatitis, AIDS and Cytomegalovirus (CMV) and fluid overload.   In the event that I wish to have an autologous transfusion of my own blood, o attending physician will determine when the applicable recovery period ends for purposes of reinstating the DNAR order.   10. Patients having a sterilization procedure: I understand that if the procedure is successful the results will be permanent and it wi a. Allow the anesthesiologist (anesthesia doctor) to give me medicine and do additional procedures as necessary.  Some examples are: Starting or using an “IV” to give me medicine, fluids or blood during my procedure, and having a breathing tube placed to he 7. Regional Anesthesia (“spinal”, “epidural”, & “nerve blocks”): I understand that rare but potential complications include headache, bleeding, infection, seizure, irregular heart rhythms, and nerve injury.     I can change my mind about having anesthesia

## (undated) NOTE — LETTER
09/09/20        Manan Nevarez      Dear Alisha Hutton,    [facility] records indicate that you have outstanding lab work and or testing that was ordered for you and has not yet been completed:  Orders Placed This Encounter

## (undated) NOTE — IP AVS SNAPSHOT
Patient Demographics     Address  [facility] [place] [address] Phone  [phone] [facility])  [phone] [facility] E-mail Address  [email or web address]      Emergency Contact(s)     Name Relation Home Work Mobile    Dawson Grayson Spouse   [phone] Bring a paper prescription for each of these medications  levofloxacin 500 MG Tabs           [id number] - MAR ACTION REPORT  (last 24 hrs)    ** SITE UNKNOWN **     Order ID Medication Name Action Time Action Reason Comments    [id number] ARIPiprazole (ABILI Temp  99.3 °F (37.4 °C) Filed at 01/22/2020 1200   SpO2  95 % Filed at 01/22/2020 1200      Patient's Most Recent Weight       Most Recent Value   Patient Weight  64.8 kg (142 lb 12.8 oz)      Lab Results Last 24 Hours    No matching results found     Micr few days as well. No F/C. No N/V/D/C or abd pain. Pt denies any CP or SOB.      Past Medical History:  Past Medical History:   Diagnosis Date   • Depression    • Hypothyroid    • Other and unspecified hyperlipidemia         Past Surgical History: History re Abdomen: Soft, nontender, nondistended. Positive bowel sounds. No rebound, guarding or organomegaly. Neurologic: No focal neurological deficits. CNII-XII grossly intact. Musculoskeletal: Moves all extremities. Extremities: No edema or cyanosis.   Integu Location [facility] 4NW-A Attending Lucien Manuel MD   Hosp Day #[RH.1] 2[RH.2] PCP Marci Hartman     Date of Admission: 1/19/20  Date of Consult: 1/21/20  Reason for Consultation:[RH.1] Eval depression[RH.2]    Impression:[RH.1]  Pr[RH.2]socorro Chavez things. She stopped driving her car. She stopped going to [employer]ia[employer] per . She developed low mood and energy and motivation and was started on Effexor 75mg twice a day. Her mood improved. In 2018, her short term memory worsened.  She forgot t No Known Allergies    Medications:    Current Facility-Administered Medications:   •  QUEtiapine Fumarate (SEROQUEL) tab 25 mg, 25 mg, Oral, Nightly  •  glycerin-Hypromellose- (ARTIFICAL TEARS) 0.2-0.2-1 % ophthalmic solution 1 drop, 1 drop, Both Ey Orientation:[RH.1] Alert and oriented x 4[RH.2]  Attention and Concentration:[RH.1]   fair[RH.2]  Memory:[RH.1]  intact immediate, recent, remote[RH.2]  Language: Intact naming and repetition  Fund of Knowledge: Able to recite name of [address] Patient’s self-stated goal is - unknown    OBJECTIVE[AR.1]  Precautions: None[AR.2]    WEIGHT BEARING RESTRICTION[AR.1]  Weight Bearing Restriction: None[AR.2]                PAIN ASSESSMENT[AR.1]   Ratin[AR.2]          BALANCE[AR.1] and short term/long term goals for optimal functional independence and safety.     Transfers    Supine<>Sit: Min A (cues for weight shifting - HOB ~45 deg)  Sit<>Stand: CGA  Transfers: Min A   Gait: See above flow sheet  Chair Follow: NO  Sit<>Supine: NT  S training;Balance training  Rehab Potential : Fair  Frequency (Obs): 3-5x/week[AR.2]    CURRENT GOALS     Goal #1 Patient is able to demonstrate supine - sit EOB @ level: minimum assistance - met[AR.1] 1/22/2020[AR.3]       Goal #2 Patient is able to demons Counseling regarding advanced care planning and goals of care[KW.2]      Past Medical History[KW. 1]  Past Medical History:   Diagnosis Date   • Depression    • Hypothyroid    • Other and unspecified hyperlipidemia[KW.2]        Past Surgical History[KW. 1] Left Dorsiflexion  4/5    BALANCE[KW.1]  Static Sitting: Fair  Dynamic Sitting: Fair -  Static Standing: Poor +  Dynamic Standing: Poor +[KW. 2]    ADDITIONAL TESTS                                    NEUROLOGICAL FINDINGS[KW.1]  Neurological Findings: Sensa constant cuing for safety awareness. Pt returned to room sitting up in bedside chair. Pt and family educated on POC, HEP, and recommendations. Pt left with call light in reach. RN aware.      Exercise/Education Provided:  Bed mobility  Energy conservation Frequency (Obs): 3-5x/week  Number of Visits to Meet Established Goals: 5[KW. 2]      CURRENT GOALS    Goal #1 Patient is able to demonstrate supine - sit EOB @ level: minimum assistance     Goal #2 Patient is able to demonstrate transfers EOB to/from [facility] a • Hypothyroid    • Other and unspecified hyperlipidemia        Past Surgical History  History reviewed. No pertinent surgical history. SUBJECTIVE  Pt states \"What do you want me to do? \"    Patient self-stated goal is to take a shower    OBJECTIVE  Pre Pt is progressing towards OT goals. Pt presents with deficits in balance, sequencing, working memory, following 2 step commands, attention, RW safety, endurance, use of adaptive techniques impacting safety and independence in ADL/functional transfers.  Pt w Author:  Alyssa Morin OT Service:  Rehab Author Type:  Occupational Therapist    Filed:  1/20/2020 11:02 AM Date of Service:  1/20/2020 10:49 AM Status:  Signed    :  Alyssa Morin OT (Occupational Therapist)       OCCUPATIONAL THERAPY EV requires assist for compliance with medication. Family reports pt may stay in bed for up to a few days at a time at home. Family reports pt has passed out and fallen in shower several times in the past.     SUBJECTIVE   \"I'm okay. \"    Patient self-stated -   Putting on and taking off regular upper body clothing?: A Little  -   Taking care of personal grooming such as brushing teeth?: A Little  -   Eating meals?: A Little    AM-PAC Score:  Score: 17  Approx Degree of Impairment: 50.11%  Standardized Score ( Subacute rehab is recommended for 14-17 days. After this period of rehabilitation patient should achieve SUPV level in BADLs and functional transfers.     The patient is functioning below her previous functional level and would benefit from skilled inpatie Patient will transfer to toilet:  with supervision  Patient will transfer to shower:  with supervision    UE Exercise Program Goal  Patient will be modified independent with bilateral AROM HEP (home exercise program). [BJ.1]       Attribution Key    BJ.1 -

## (undated) NOTE — IP AVS SNAPSHOT
Patient Demographics     Address  [address] [place] [address] Phone  [phone] [facility]  [phone] [facility] E-mail Address  [email or web address]      Emergency Contact(s)     Name Relation Home Work Mobile    Dawson Grayson Spouse   [phone] per day. Do NOT exceed 4g (4000mg) of acetaminophen daily. *We will discuss pain management and weaning off pain medicine at your 1st postoperative appointment.  To avoid delays in getting your narcotic pain medicine refilled, please contact the office prior you elevate your leg above the level of your heart 4 times per day for 1 hour each time. After a week, elevate your leg as needed if swelling is noted. Call your Surgeons office if you have:  ? A temperature greater than 100.4 F.  ?  Increasing pain or nu none of the edges have rolled up exposing the wound – if the dressing has become open to the environment, do not allow water to enter into this area)  ?  Once the original postoperative dressing is removed (for both knee and hip replacement patients), you d infections. Wash your hands with soap and water prior to performing any dressing changes or wound evaluation.     Medications/Special Instructions:  · Continue taking calcium and vitamin D supplementation as you had prescribed/taken prior to hospital admiss If you do not have a follow-up appointment with your surgeon, or you need to change your follow-up appointment date/time, please call today to schedule or change this appointment: [phone].  Our phones are open to schedule appointments from 8:30am to Commonly known as:  LOVENOX  Next dose due: Tonight 2100      Inject 0.4 mL (40 mg total) into the skin nightly for 18 days.   Stop taking on:  July 14, 2020   Christopher Carter MD         LEVOTHYROXINE SODIUM OR  Next dose due:  [address] [place] [address] [id number] atorvastatin (LIPITOR) tab 10 mg 06/28/20 2107 Given      [id number] docusate sodium (COLACE) cap 100 mg 06/28/20 2108 Given      [id number] docusate sodium (COLACE) cap 100 mg 06/29/20 0953 Given      [id number] hydrocortisone 1 % cream 06/28/20 2 Related Notes:  Original Note by Jacquelin Gay MD (Physician) filed at 2020  1:20 AM         [facility]  History and Physical     Claudine Magdaleno Patient Status:  Emergency    1940 MRN [id number]   [address] Venlafaxine HCl ER 75 MG Oral Capsule SR 24 Hr, Take 225 mg by mouth daily. , Disp: , Rfl:   LEVOTHYROXINE SODIUM OR, Take 50 mcg by mouth daily. , Disp: , Rfl:   docusate sodium 100 MG Oral Cap, Take 100 mg by mouth 2 (two) times daily. , Disp: , Rfl:   ac BUN 33*   CREATSERUM 1.53*   GFRAA 38*   GFRNAA 33*   CA 9.1   ALB 3.7      K 4.1      CO2 31.0   ALKPHO 55   AST 21   ALT 22   BILT 0.2   TP 8.2       Estimated Creatinine Clearance: 23.2 mL/min (A) (based on SCr of 1.53 mg/dL (H)).     Recent History of Present Illness: Jose Zheng is a 70year old female with medical history of dementia, depression, hypothyroidism, hyperlipidemia who presents with fall and right hip pain. Patient was walking into her house and cut her leg on the step.   Mag Bains ARIPiprazole 2 MG Oral Tab, Take 1 tablet (2 mg total) by mouth daily.  (Patient not taking: Reported on 6/24/2020 ), Disp: 30 tablet, Rfl: 0  Donepezil HCl 10 MG Oral Tab, Take 10 mg by mouth nightly., Disp: , Rfl:   Coenzyme Q10 (CO Q-10 OR), Take  by ranjeet Imaging: Imaging data reviewed in Epic. ASSESSMENT / PLAN:     1. Mechanical fall  2. Acute right hip femoral neck fracture  1. Consult Ortho  2. N.p.o. for possible surgery tomorrow  3. Pain control  4. Able to walk up multiple flights of stairs. Past Medical History:   Diagnosis Date   • Anxiety state    • Dementia ([facility] [address]    • Depression    • High cholesterol    • Hypothyroid    • Other and unspecified hyperlipidemia      History reviewed. No pertinent surgical history.   No current outpatient medic negative, hemoglobin 13.2, INR 1[RS. 2]      Assessment/Plan:[RS.3  70year old[RS.3] female with acute closed traumatic right hip femoral neck fracture.  Discussed etiology as well as treatment options, with recommendation for surgical treatment as long as RS.3 - Clifford Snow MD on 6/25/2020  8:50 AM                     D/C Summary    No notes of this type exist for this encounter.      Physical Therapy Notes (last 72 hours) (Notes from 6/26/2020  3:45 PM through 6/29/2020  3:45 PM)    No notes of this typ Patient self-stated goal is not stated.      OBJECTIVE  Precautions: MARCIAL - posterior    WEIGHT BEARING RESTRICTION  Weight Bearing Restriction: R lower extremity        R Lower Extremity: Weight Bearing as Tolerated       PAIN ASSESSMENT  Ratin  Locatio via mod assist to control descent. Patient also educated on OT role, safety, fall prevention, pain management with fair verbal understanding. PPE worn by therapist this session: Surgical mask, gloves.   Patient End of Session: Up in chair;Needs met;Call No notes of this type exist for this encounter.      Immunizations     Name Date      INFLUENZA defer-01/22/20     Deferral:  Patient Refused       Future Appointments        Provider Pablito Cortney    7/10/2020 2:00 PM Jnoathan Hernandez MD [facility]

## (undated) NOTE — LETTER
09/09/20        Manan Nevarez      Dear Pawel Pearson,    [facility] records indicate that you have outstanding lab work and or testing that was ordered for you and has not yet been completed:  Orders Placed This Encounter